# Patient Record
Sex: FEMALE | Race: WHITE | NOT HISPANIC OR LATINO
[De-identification: names, ages, dates, MRNs, and addresses within clinical notes are randomized per-mention and may not be internally consistent; named-entity substitution may affect disease eponyms.]

---

## 2017-01-10 ENCOUNTER — LABORATORY RESULT (OUTPATIENT)
Age: 82
End: 2017-01-10

## 2017-01-10 ENCOUNTER — APPOINTMENT (OUTPATIENT)
Dept: CARDIOLOGY | Facility: CLINIC | Age: 82
End: 2017-01-10

## 2017-01-10 ENCOUNTER — NON-APPOINTMENT (OUTPATIENT)
Age: 82
End: 2017-01-10

## 2017-01-10 VITALS
HEART RATE: 88 BPM | OXYGEN SATURATION: 97 % | SYSTOLIC BLOOD PRESSURE: 140 MMHG | BODY MASS INDEX: 29.64 KG/M2 | TEMPERATURE: 98 F | WEIGHT: 149 LBS | DIASTOLIC BLOOD PRESSURE: 90 MMHG | HEIGHT: 59.5 IN

## 2017-01-11 LAB
25(OH)D3 SERPL-MCNC: 31.3 NG/ML
ALBUMIN SERPL ELPH-MCNC: 4.4 G/DL
ALP BLD-CCNC: 74 U/L
ALT SERPL-CCNC: 7 U/L
ANION GAP SERPL CALC-SCNC: 15 MMOL/L
APPEARANCE: CLEAR
AST SERPL-CCNC: 16 U/L
BACTERIA: NEGATIVE
BASOPHILS # BLD AUTO: 0.01 K/UL
BASOPHILS NFR BLD AUTO: 0.1 %
BILIRUB SERPL-MCNC: 0.3 MG/DL
BILIRUBIN URINE: NEGATIVE
BLOOD URINE: NEGATIVE
BUN SERPL-MCNC: 12 MG/DL
CALCIUM SERPL-MCNC: 10.5 MG/DL
CHLORIDE SERPL-SCNC: 101 MMOL/L
CHOLEST SERPL-MCNC: 238 MG/DL
CHOLEST/HDLC SERPL: 4.5 RATIO
CO2 SERPL-SCNC: 25 MMOL/L
COLOR: ABNORMAL
CREAT SERPL-MCNC: 0.59 MG/DL
EOSINOPHIL # BLD AUTO: 0.09 K/UL
EOSINOPHIL NFR BLD AUTO: 1.3 %
FT4I SERPL CALC-MCNC: 8.9 INDEX
GLUCOSE QUALITATIVE U: NORMAL MG/DL
GLUCOSE SERPL-MCNC: 96 MG/DL
HBA1C MFR BLD HPLC: 5.8 %
HCT VFR BLD CALC: 42.8 %
HDLC SERPL-MCNC: 53 MG/DL
HGB BLD-MCNC: 13.9 G/DL
HYALINE CASTS: 1 /LPF
IMM GRANULOCYTES NFR BLD AUTO: 0.1 %
KETONES URINE: NEGATIVE
LDLC SERPL CALC-MCNC: 154 MG/DL
LEUKOCYTE ESTERASE URINE: NEGATIVE
LYMPHOCYTES # BLD AUTO: 2.68 K/UL
LYMPHOCYTES NFR BLD AUTO: 38.6 %
MAN DIFF?: NORMAL
MCHC RBC-ENTMCNC: 29.9 PG
MCHC RBC-ENTMCNC: 32.5 GM/DL
MCV RBC AUTO: 92 FL
MICROSCOPIC-UA: NORMAL
MONOCYTES # BLD AUTO: 0.43 K/UL
MONOCYTES NFR BLD AUTO: 6.2 %
NEUTROPHILS # BLD AUTO: 3.73 K/UL
NEUTROPHILS NFR BLD AUTO: 53.7 %
NITRITE URINE: NEGATIVE
PH URINE: 6
PLATELET # BLD AUTO: 401 K/UL
POTASSIUM SERPL-SCNC: 4.9 MMOL/L
PROT SERPL-MCNC: 7.7 G/DL
PROTEIN URINE: NEGATIVE MG/DL
RBC # BLD: 4.65 M/UL
RBC # FLD: 13.2 %
RED BLOOD CELLS URINE: 4 /HPF
SODIUM SERPL-SCNC: 141 MMOL/L
SPECIFIC GRAVITY URINE: 1.02
SQUAMOUS EPITHELIAL CELLS: 2 /HPF
T3 SERPL-MCNC: 122 NG/DL
T3RU NFR SERPL: 1.09 INDEX
T4 SERPL-MCNC: 9.7 UG/DL
TRIGL SERPL-MCNC: 155 MG/DL
TSH SERPL-ACNC: 0.47 UIU/ML
UROBILINOGEN URINE: NORMAL MG/DL
WBC # FLD AUTO: 6.95 K/UL
WHITE BLOOD CELLS URINE: 5 /HPF

## 2017-01-17 RX ORDER — ZOLPIDEM TARTRATE 6.25 MG/1
6.25 TABLET, EXTENDED RELEASE ORAL
Qty: 30 | Refills: 2 | Status: DISCONTINUED | COMMUNITY
Start: 2017-01-10 | End: 2017-01-17

## 2017-03-23 ENCOUNTER — MEDICATION RENEWAL (OUTPATIENT)
Age: 82
End: 2017-03-23

## 2017-05-16 ENCOUNTER — NON-APPOINTMENT (OUTPATIENT)
Age: 82
End: 2017-05-16

## 2017-05-16 ENCOUNTER — LABORATORY RESULT (OUTPATIENT)
Age: 82
End: 2017-05-16

## 2017-05-16 ENCOUNTER — APPOINTMENT (OUTPATIENT)
Dept: CARDIOLOGY | Facility: CLINIC | Age: 82
End: 2017-05-16

## 2017-05-16 VITALS
HEIGHT: 59.5 IN | WEIGHT: 149 LBS | BODY MASS INDEX: 29.64 KG/M2 | OXYGEN SATURATION: 99 % | HEART RATE: 76 BPM | SYSTOLIC BLOOD PRESSURE: 134 MMHG | DIASTOLIC BLOOD PRESSURE: 80 MMHG

## 2017-05-16 RX ORDER — ESZOPICLONE 2 MG/1
2 TABLET, FILM COATED ORAL
Qty: 30 | Refills: 5 | Status: DISCONTINUED | COMMUNITY
Start: 2017-01-17 | End: 2017-05-16

## 2017-05-16 RX ORDER — AZITHROMYCIN DIHYDRATE 250 MG/1
250 TABLET, FILM COATED ORAL
Qty: 1 | Refills: 0 | Status: DISCONTINUED | COMMUNITY
Start: 2017-02-15 | End: 2017-05-16

## 2017-05-17 LAB
25(OH)D3 SERPL-MCNC: 45.7 NG/ML
ALBUMIN SERPL ELPH-MCNC: 4.4 G/DL
ALP BLD-CCNC: 58 U/L
ALT SERPL-CCNC: 10 U/L
ANION GAP SERPL CALC-SCNC: 19 MMOL/L
APPEARANCE: CLEAR
AST SERPL-CCNC: 15 U/L
BACTERIA: NEGATIVE
BASOPHILS # BLD AUTO: 0.01 K/UL
BASOPHILS NFR BLD AUTO: 0.2 %
BILIRUB SERPL-MCNC: 0.2 MG/DL
BILIRUBIN URINE: NEGATIVE
BLOOD URINE: NEGATIVE
BUN SERPL-MCNC: 14 MG/DL
CALCIUM OXALATE CRYSTALS: ABNORMAL
CALCIUM SERPL-MCNC: 10.4 MG/DL
CHLORIDE SERPL-SCNC: 101 MMOL/L
CHOLEST SERPL-MCNC: 240 MG/DL
CHOLEST/HDLC SERPL: 3.9 RATIO
CO2 SERPL-SCNC: 23 MMOL/L
COLOR: YELLOW
CREAT SERPL-MCNC: 0.65 MG/DL
EOSINOPHIL # BLD AUTO: 0.12 K/UL
EOSINOPHIL NFR BLD AUTO: 1.9 %
FT4I SERPL CALC-MCNC: 8.8 INDEX
GLUCOSE QUALITATIVE U: NORMAL MG/DL
GLUCOSE SERPL-MCNC: 95 MG/DL
HBA1C MFR BLD HPLC: 5.7 %
HCT VFR BLD CALC: 41.5 %
HDLC SERPL-MCNC: 62 MG/DL
HGB BLD-MCNC: 13 G/DL
HYALINE CASTS: 2 /LPF
IMM GRANULOCYTES NFR BLD AUTO: 0.2 %
KETONES URINE: NEGATIVE
LDLC SERPL CALC-MCNC: 159 MG/DL
LEUKOCYTE ESTERASE URINE: NEGATIVE
LYMPHOCYTES # BLD AUTO: 2 K/UL
LYMPHOCYTES NFR BLD AUTO: 31.3 %
MAN DIFF?: NORMAL
MCHC RBC-ENTMCNC: 29.8 PG
MCHC RBC-ENTMCNC: 31.3 GM/DL
MCV RBC AUTO: 95.2 FL
MICROSCOPIC-UA: NORMAL
MONOCYTES # BLD AUTO: 0.49 K/UL
MONOCYTES NFR BLD AUTO: 7.7 %
NEUTROPHILS # BLD AUTO: 3.75 K/UL
NEUTROPHILS NFR BLD AUTO: 58.7 %
NITRITE URINE: NEGATIVE
PH URINE: 7
PLATELET # BLD AUTO: 337 K/UL
POTASSIUM SERPL-SCNC: 4.7 MMOL/L
PROT SERPL-MCNC: 7.1 G/DL
PROTEIN URINE: NEGATIVE MG/DL
RBC # BLD: 4.36 M/UL
RBC # FLD: 14 %
RED BLOOD CELLS URINE: 2 /HPF
SODIUM SERPL-SCNC: 143 MMOL/L
SPECIFIC GRAVITY URINE: 1.02
SQUAMOUS EPITHELIAL CELLS: 4 /HPF
T3 SERPL-MCNC: 115 NG/DL
T3RU NFR SERPL: 1.06 INDEX
T4 SERPL-MCNC: 9.3 UG/DL
TRIGL SERPL-MCNC: 96 MG/DL
TSH SERPL-ACNC: 0.84 UIU/ML
UROBILINOGEN URINE: NORMAL MG/DL
WBC # FLD AUTO: 6.38 K/UL
WHITE BLOOD CELLS URINE: 6 /HPF

## 2017-07-06 ENCOUNTER — RESULT REVIEW (OUTPATIENT)
Age: 82
End: 2017-07-06

## 2017-08-02 ENCOUNTER — MEDICATION RENEWAL (OUTPATIENT)
Age: 82
End: 2017-08-02

## 2017-08-03 ENCOUNTER — APPOINTMENT (OUTPATIENT)
Dept: ENDOCRINOLOGY | Facility: CLINIC | Age: 82
End: 2017-08-03
Payer: MEDICARE

## 2017-08-03 VITALS
HEART RATE: 94 BPM | SYSTOLIC BLOOD PRESSURE: 146 MMHG | BODY MASS INDEX: 29.43 KG/M2 | OXYGEN SATURATION: 97 % | HEIGHT: 59 IN | WEIGHT: 146 LBS | DIASTOLIC BLOOD PRESSURE: 94 MMHG

## 2017-08-03 PROCEDURE — 99214 OFFICE O/P EST MOD 30 MIN: CPT | Mod: 25

## 2017-08-03 PROCEDURE — 96372 THER/PROPH/DIAG INJ SC/IM: CPT

## 2017-08-03 RX ORDER — DENOSUMAB 60 MG/ML
60 INJECTION SUBCUTANEOUS
Qty: 1 | Refills: 0 | Status: COMPLETED | OUTPATIENT
Start: 2017-08-03

## 2017-08-03 RX ADMIN — DENOSUMAB 0 MG/ML: 60 INJECTION SUBCUTANEOUS at 00:00

## 2017-10-11 ENCOUNTER — MEDICATION RENEWAL (OUTPATIENT)
Age: 82
End: 2017-10-11

## 2017-11-18 ENCOUNTER — OUTPATIENT (OUTPATIENT)
Dept: OUTPATIENT SERVICES | Facility: HOSPITAL | Age: 82
LOS: 1 days | End: 2017-11-18
Payer: MEDICARE

## 2017-11-18 ENCOUNTER — APPOINTMENT (OUTPATIENT)
Dept: CT IMAGING | Facility: CLINIC | Age: 82
End: 2017-11-18
Payer: MEDICARE

## 2017-11-18 DIAGNOSIS — Z00.8 ENCOUNTER FOR OTHER GENERAL EXAMINATION: ICD-10-CM

## 2017-11-18 PROCEDURE — 74176 CT ABD & PELVIS W/O CONTRAST: CPT

## 2017-11-18 PROCEDURE — 74176 CT ABD & PELVIS W/O CONTRAST: CPT | Mod: 26

## 2017-12-11 ENCOUNTER — MEDICATION RENEWAL (OUTPATIENT)
Age: 82
End: 2017-12-11

## 2017-12-16 ENCOUNTER — APPOINTMENT (OUTPATIENT)
Dept: CT IMAGING | Facility: CLINIC | Age: 82
End: 2017-12-16
Payer: MEDICARE

## 2017-12-16 ENCOUNTER — OUTPATIENT (OUTPATIENT)
Dept: OUTPATIENT SERVICES | Facility: HOSPITAL | Age: 82
LOS: 1 days | End: 2017-12-16
Payer: MEDICARE

## 2017-12-16 DIAGNOSIS — R31.29 OTHER MICROSCOPIC HEMATURIA: ICD-10-CM

## 2017-12-16 PROCEDURE — 74178 CT ABD&PLV WO CNTR FLWD CNTR: CPT | Mod: 26

## 2017-12-16 PROCEDURE — 82565 ASSAY OF CREATININE: CPT

## 2017-12-16 PROCEDURE — 74178 CT ABD&PLV WO CNTR FLWD CNTR: CPT

## 2018-01-24 DIAGNOSIS — Z87.09 PERSONAL HISTORY OF OTHER DISEASES OF THE RESPIRATORY SYSTEM: ICD-10-CM

## 2018-01-30 ENCOUNTER — APPOINTMENT (OUTPATIENT)
Dept: CARDIOLOGY | Facility: CLINIC | Age: 83
End: 2018-01-30

## 2018-02-09 ENCOUNTER — APPOINTMENT (OUTPATIENT)
Dept: ENDOCRINOLOGY | Facility: CLINIC | Age: 83
End: 2018-02-09

## 2018-02-20 ENCOUNTER — APPOINTMENT (OUTPATIENT)
Dept: ENDOCRINOLOGY | Facility: CLINIC | Age: 83
End: 2018-02-20
Payer: MEDICARE

## 2018-02-20 VITALS
SYSTOLIC BLOOD PRESSURE: 122 MMHG | HEART RATE: 85 BPM | DIASTOLIC BLOOD PRESSURE: 62 MMHG | BODY MASS INDEX: 30.04 KG/M2 | WEIGHT: 149 LBS | OXYGEN SATURATION: 98 % | HEIGHT: 59 IN

## 2018-02-20 PROCEDURE — 99214 OFFICE O/P EST MOD 30 MIN: CPT | Mod: 25

## 2018-02-20 PROCEDURE — 96372 THER/PROPH/DIAG INJ SC/IM: CPT

## 2018-02-20 PROCEDURE — 77080 DXA BONE DENSITY AXIAL: CPT | Mod: GA

## 2018-02-20 RX ORDER — AZITHROMYCIN DIHYDRATE 250 MG/1
250 TABLET, FILM COATED ORAL
Qty: 1 | Refills: 0 | Status: DISCONTINUED | COMMUNITY
Start: 2018-01-24 | End: 2018-02-20

## 2018-02-20 RX ORDER — DENOSUMAB 60 MG/ML
60 INJECTION SUBCUTANEOUS
Qty: 0 | Refills: 0 | Status: COMPLETED | OUTPATIENT
Start: 2018-02-20

## 2018-02-20 RX ADMIN — DENOSUMAB 0 MG/ML: 60 INJECTION SUBCUTANEOUS at 00:00

## 2018-02-21 ENCOUNTER — MEDICATION RENEWAL (OUTPATIENT)
Age: 83
End: 2018-02-21

## 2018-02-26 ENCOUNTER — MEDICATION RENEWAL (OUTPATIENT)
Age: 83
End: 2018-02-26

## 2018-05-07 ENCOUNTER — LABORATORY RESULT (OUTPATIENT)
Age: 83
End: 2018-05-07

## 2018-05-07 ENCOUNTER — APPOINTMENT (OUTPATIENT)
Dept: CARDIOLOGY | Facility: CLINIC | Age: 83
End: 2018-05-07
Payer: MEDICARE

## 2018-05-07 ENCOUNTER — NON-APPOINTMENT (OUTPATIENT)
Age: 83
End: 2018-05-07

## 2018-05-07 VITALS
DIASTOLIC BLOOD PRESSURE: 84 MMHG | SYSTOLIC BLOOD PRESSURE: 140 MMHG | OXYGEN SATURATION: 99 % | HEART RATE: 81 BPM | BODY MASS INDEX: 29.64 KG/M2 | WEIGHT: 147 LBS | HEIGHT: 59 IN

## 2018-05-07 DIAGNOSIS — M62.838 OTHER MUSCLE SPASM: ICD-10-CM

## 2018-05-07 PROCEDURE — 36415 COLL VENOUS BLD VENIPUNCTURE: CPT

## 2018-05-07 PROCEDURE — 93000 ELECTROCARDIOGRAM COMPLETE: CPT

## 2018-05-07 PROCEDURE — 99214 OFFICE O/P EST MOD 30 MIN: CPT

## 2018-05-08 LAB
25(OH)D3 SERPL-MCNC: 31.6 NG/ML
ALBUMIN SERPL ELPH-MCNC: 4.8 G/DL
ALP BLD-CCNC: 57 U/L
ALT SERPL-CCNC: 14 U/L
ANION GAP SERPL CALC-SCNC: 13 MMOL/L
AST SERPL-CCNC: 44 U/L
BASOPHILS # BLD AUTO: 0.12 K/UL
BASOPHILS NFR BLD AUTO: 1.9 %
BILIRUB SERPL-MCNC: 0.2 MG/DL
BUN SERPL-MCNC: 13 MG/DL
CALCIUM SERPL-MCNC: 10.5 MG/DL
CHLORIDE SERPL-SCNC: 101 MMOL/L
CHOLEST SERPL-MCNC: 283 MG/DL
CHOLEST/HDLC SERPL: 4.7 RATIO
CO2 SERPL-SCNC: 27 MMOL/L
CREAT SERPL-MCNC: 0.78 MG/DL
EOSINOPHIL # BLD AUTO: 0.06 K/UL
EOSINOPHIL NFR BLD AUTO: 1 %
FT4I SERPL CALC-MCNC: 8.6 INDEX
GLUCOSE SERPL-MCNC: 89 MG/DL
HBA1C MFR BLD HPLC: 5.6 %
HCT VFR BLD CALC: 44.2 %
HDLC SERPL-MCNC: 60 MG/DL
HGB BLD-MCNC: 14.2 G/DL
IMM GRANULOCYTES NFR BLD AUTO: 0.2 %
LDLC SERPL CALC-MCNC: 181 MG/DL
LYMPHOCYTES # BLD AUTO: 2.64 K/UL
LYMPHOCYTES NFR BLD AUTO: 42.6 %
MAN DIFF?: NORMAL
MCHC RBC-ENTMCNC: 30.3 PG
MCHC RBC-ENTMCNC: 32.1 GM/DL
MCV RBC AUTO: 94.2 FL
MONOCYTES # BLD AUTO: 0.45 K/UL
MONOCYTES NFR BLD AUTO: 7.3 %
NEUTROPHILS # BLD AUTO: 2.91 K/UL
NEUTROPHILS NFR BLD AUTO: 47 %
PLATELET # BLD AUTO: 314 K/UL
POTASSIUM SERPL-SCNC: 6.2 MMOL/L
PROT SERPL-MCNC: 8.1 G/DL
RBC # BLD: 4.69 M/UL
RBC # FLD: 14 %
SODIUM SERPL-SCNC: 141 MMOL/L
T3 SERPL-MCNC: 154 NG/DL
T3RU NFR SERPL: 1.05 INDEX
T4 SERPL-MCNC: 9 UG/DL
TRIGL SERPL-MCNC: 209 MG/DL
TSH SERPL-ACNC: 0.36 UIU/ML
WBC # FLD AUTO: 6.19 K/UL

## 2018-05-14 ENCOUNTER — MEDICATION RENEWAL (OUTPATIENT)
Age: 83
End: 2018-05-14

## 2018-05-17 LAB
ANION GAP SERPL CALC-SCNC: 16 MMOL/L
BUN SERPL-MCNC: 16 MG/DL
CALCIUM SERPL-MCNC: 10.9 MG/DL
CALCIUM SERPL-MCNC: 11 MG/DL
CHLORIDE SERPL-SCNC: 100 MMOL/L
CO2 SERPL-SCNC: 27 MMOL/L
CREAT SERPL-MCNC: 0.73 MG/DL
GLUCOSE SERPL-MCNC: 93 MG/DL
PARATHYROID HORMONE INTACT: 35 PG/ML
POTASSIUM SERPL-SCNC: 4.4 MMOL/L
SODIUM SERPL-SCNC: 143 MMOL/L

## 2018-05-18 ENCOUNTER — MEDICATION RENEWAL (OUTPATIENT)
Age: 83
End: 2018-05-18

## 2018-07-16 ENCOUNTER — MEDICATION RENEWAL (OUTPATIENT)
Age: 83
End: 2018-07-16

## 2018-09-04 ENCOUNTER — APPOINTMENT (OUTPATIENT)
Dept: ENDOCRINOLOGY | Facility: CLINIC | Age: 83
End: 2018-09-04
Payer: MEDICARE

## 2018-09-04 VITALS
HEART RATE: 104 BPM | WEIGHT: 144 LBS | DIASTOLIC BLOOD PRESSURE: 70 MMHG | SYSTOLIC BLOOD PRESSURE: 120 MMHG | OXYGEN SATURATION: 98 % | HEIGHT: 59 IN | BODY MASS INDEX: 29.03 KG/M2

## 2018-09-04 PROCEDURE — 96372 THER/PROPH/DIAG INJ SC/IM: CPT

## 2018-09-04 PROCEDURE — 99214 OFFICE O/P EST MOD 30 MIN: CPT | Mod: 25

## 2018-09-04 RX ORDER — DENOSUMAB 60 MG/ML
60 INJECTION SUBCUTANEOUS
Qty: 0 | Refills: 0 | Status: COMPLETED | OUTPATIENT
Start: 2018-09-04

## 2018-09-04 RX ADMIN — DENOSUMAB 0 MG/ML: 60 INJECTION SUBCUTANEOUS at 00:00

## 2018-10-01 ENCOUNTER — MEDICATION RENEWAL (OUTPATIENT)
Age: 83
End: 2018-10-01

## 2018-11-27 ENCOUNTER — LABORATORY RESULT (OUTPATIENT)
Age: 83
End: 2018-11-27

## 2018-11-27 ENCOUNTER — NON-APPOINTMENT (OUTPATIENT)
Age: 83
End: 2018-11-27

## 2018-11-27 ENCOUNTER — APPOINTMENT (OUTPATIENT)
Dept: CARDIOLOGY | Facility: CLINIC | Age: 83
End: 2018-11-27
Payer: MEDICARE

## 2018-11-27 VITALS
BODY MASS INDEX: 29.43 KG/M2 | WEIGHT: 146 LBS | OXYGEN SATURATION: 97 % | HEART RATE: 69 BPM | DIASTOLIC BLOOD PRESSURE: 80 MMHG | SYSTOLIC BLOOD PRESSURE: 140 MMHG | HEIGHT: 59 IN

## 2018-11-27 PROCEDURE — 36415 COLL VENOUS BLD VENIPUNCTURE: CPT

## 2018-11-27 PROCEDURE — 93000 ELECTROCARDIOGRAM COMPLETE: CPT

## 2018-11-27 PROCEDURE — 99214 OFFICE O/P EST MOD 30 MIN: CPT

## 2018-11-28 ENCOUNTER — LABORATORY RESULT (OUTPATIENT)
Age: 83
End: 2018-11-28

## 2018-11-28 LAB
25(OH)D3 SERPL-MCNC: 71.7 NG/ML
ALBUMIN SERPL ELPH-MCNC: 4.5 G/DL
ALP BLD-CCNC: 50 U/L
ALT SERPL-CCNC: 12 U/L
ANION GAP SERPL CALC-SCNC: 10 MMOL/L
APPEARANCE: ABNORMAL
AST SERPL-CCNC: 16 U/L
BACTERIA: NEGATIVE
BASOPHILS # BLD AUTO: 0.01 K/UL
BASOPHILS NFR BLD AUTO: 0.2 %
BILIRUB SERPL-MCNC: 0.4 MG/DL
BILIRUBIN URINE: NEGATIVE
BLOOD URINE: NEGATIVE
BUN SERPL-MCNC: 10 MG/DL
CALCIUM SERPL-MCNC: 9.6 MG/DL
CHLORIDE SERPL-SCNC: 104 MMOL/L
CHOLEST SERPL-MCNC: 247 MG/DL
CHOLEST/HDLC SERPL: 4.3 RATIO
CO2 SERPL-SCNC: 28 MMOL/L
COLOR: YELLOW
CREAT SERPL-MCNC: 0.58 MG/DL
EOSINOPHIL # BLD AUTO: 0.08 K/UL
EOSINOPHIL NFR BLD AUTO: 1.6 %
FT4I SERPL CALC-MCNC: 6.5 INDEX
GLUCOSE QUALITATIVE U: NEGATIVE MG/DL
GLUCOSE SERPL-MCNC: 92 MG/DL
HBA1C MFR BLD HPLC: 5.5 %
HCT VFR BLD CALC: 43.1 %
HDLC SERPL-MCNC: 57 MG/DL
HGB BLD-MCNC: 13.6 G/DL
HYALINE CASTS: 2 /LPF
IMM GRANULOCYTES NFR BLD AUTO: 0 %
KETONES URINE: NEGATIVE
LDLC SERPL CALC-MCNC: 157 MG/DL
LEUKOCYTE ESTERASE URINE: NEGATIVE
LYMPHOCYTES # BLD AUTO: 2.14 K/UL
LYMPHOCYTES NFR BLD AUTO: 43.8 %
MAN DIFF?: NORMAL
MCHC RBC-ENTMCNC: 30.2 PG
MCHC RBC-ENTMCNC: 31.6 GM/DL
MCV RBC AUTO: 95.8 FL
MICROSCOPIC-UA: NORMAL
MONOCYTES # BLD AUTO: 0.46 K/UL
MONOCYTES NFR BLD AUTO: 9.4 %
NEUTROPHILS # BLD AUTO: 2.2 K/UL
NEUTROPHILS NFR BLD AUTO: 45 %
NITRITE URINE: NEGATIVE
PH URINE: 8.5
PLATELET # BLD AUTO: 305 K/UL
POTASSIUM SERPL-SCNC: 4.2 MMOL/L
PROT SERPL-MCNC: 7.1 G/DL
PROTEIN URINE: ABNORMAL MG/DL
RBC # BLD: 4.5 M/UL
RBC # FLD: 14.1 %
RED BLOOD CELLS URINE: 5 /HPF
SODIUM SERPL-SCNC: 143 MMOL/L
SPECIFIC GRAVITY URINE: 1.01
SQUAMOUS EPITHELIAL CELLS: 2 /HPF
T3 SERPL-MCNC: 109 NG/DL
T3RU NFR SERPL: 1.11 INDEX
T4 SERPL-MCNC: 7.2 UG/DL
TRIGL SERPL-MCNC: 164 MG/DL
TSH SERPL-ACNC: 0.65 UIU/ML
UROBILINOGEN URINE: NEGATIVE MG/DL
WBC # FLD AUTO: 4.89 K/UL
WHITE BLOOD CELLS URINE: 1 /HPF

## 2018-12-06 ENCOUNTER — RX RENEWAL (OUTPATIENT)
Age: 83
End: 2018-12-06

## 2018-12-10 ENCOUNTER — MEDICATION RENEWAL (OUTPATIENT)
Age: 83
End: 2018-12-10

## 2018-12-17 LAB — BACTERIA UR CULT: ABNORMAL

## 2018-12-31 ENCOUNTER — APPOINTMENT (OUTPATIENT)
Dept: OPHTHALMOLOGY | Facility: CLINIC | Age: 83
End: 2018-12-31
Payer: MEDICARE

## 2018-12-31 DIAGNOSIS — H26.9 UNSPECIFIED CATARACT: ICD-10-CM

## 2018-12-31 DIAGNOSIS — H04.123 DRY EYE SYNDROME OF BILATERAL LACRIMAL GLANDS: ICD-10-CM

## 2018-12-31 PROCEDURE — 92004 COMPRE OPH EXAM NEW PT 1/>: CPT

## 2019-02-14 PROBLEM — Z00.00 ENCOUNTER FOR PREVENTIVE HEALTH EXAMINATION: Noted: 2019-02-14

## 2019-02-19 ENCOUNTER — MEDICATION RENEWAL (OUTPATIENT)
Age: 84
End: 2019-02-19

## 2019-02-26 ENCOUNTER — APPOINTMENT (OUTPATIENT)
Dept: OPHTHALMOLOGY | Facility: CLINIC | Age: 84
End: 2019-02-26
Payer: MEDICARE

## 2019-02-26 ENCOUNTER — APPOINTMENT (OUTPATIENT)
Dept: OPHTHALMOLOGY | Facility: CLINIC | Age: 84
End: 2019-02-26

## 2019-02-26 DIAGNOSIS — H00.11 CHALAZION RIGHT UPPER EYELID: ICD-10-CM

## 2019-02-26 PROCEDURE — 92012 INTRM OPH EXAM EST PATIENT: CPT

## 2019-03-06 ENCOUNTER — APPOINTMENT (OUTPATIENT)
Dept: ENDOCRINOLOGY | Facility: CLINIC | Age: 84
End: 2019-03-06
Payer: MEDICARE

## 2019-03-06 ENCOUNTER — APPOINTMENT (OUTPATIENT)
Dept: UROLOGY | Facility: CLINIC | Age: 84
End: 2019-03-06

## 2019-03-06 VITALS
OXYGEN SATURATION: 98 % | BODY MASS INDEX: 29.84 KG/M2 | WEIGHT: 148 LBS | HEIGHT: 59 IN | HEART RATE: 83 BPM | DIASTOLIC BLOOD PRESSURE: 82 MMHG | SYSTOLIC BLOOD PRESSURE: 136 MMHG

## 2019-03-06 DIAGNOSIS — Z86.39 PERSONAL HISTORY OF OTHER ENDOCRINE, NUTRITIONAL AND METABOLIC DISEASE: ICD-10-CM

## 2019-03-06 PROCEDURE — 99214 OFFICE O/P EST MOD 30 MIN: CPT | Mod: 25

## 2019-03-06 PROCEDURE — 96372 THER/PROPH/DIAG INJ SC/IM: CPT

## 2019-03-06 RX ORDER — DENOSUMAB 60 MG/ML
60 INJECTION SUBCUTANEOUS
Qty: 1 | Refills: 0 | Status: COMPLETED | OUTPATIENT
Start: 2019-03-06

## 2019-03-06 RX ADMIN — DENOSUMAB 0 MG/ML: 60 INJECTION SUBCUTANEOUS at 00:00

## 2019-03-06 NOTE — REVIEW OF SYSTEMS
[Constipation] : constipation [As Noted in HPI] : as noted in HPI [Back Pain] : back pain [Negative] : Heme/Lymph

## 2019-03-07 NOTE — PROCEDURE
[FreeTextEntry1] : Bone mineral density 2/20/18\par indication: assess response to medication \par spine not performed due to DJD \par total hip -1.9 osteopenia, no significant change\par femoral neck -2.5 osteoporosis, no significant change \par proximal radius -1.7  osteopenia, no significant change \par \par Bone mineral density performed December 13, 2016\par Indication  measure response to medication\par Spine not performed\par Total hip -2.0, osteopenia, no significant change\par Femoral neck -2.7, osteoporosis, -4.2% which is not statistically significant\par Proximal radius -1.7, osteopenia, no significant change\par \par bone mineral density test performed May 5, 2015\par spine not analyzed due to scoliosis\par Total hip -2.1, osteopenia\par Femoral neck -2.5, osteoporosis\par Proximal radius -1.9, osteopenia

## 2019-03-07 NOTE — HISTORY OF PRESENT ILLNESS
[Alendronate (Fosomax)] : Alendronate [Ibandronate (Boniva)] : Ibandronate [Calcium (supplements)] : calcium from a dietary supplement [Patient taking Meds Correctly] : Patient is taking meds correctly [Prolia (Denosumab)] : Prolia (Denosumab) [Regular Dental Follow-Up] : regular dental follow-up [FreeTextEntry1] : 84 year-old female with osteoporosis. \par \par Told of low bone density for many years. She took Fosamax in the past and then took Boniva for some period of time although she does not remember how long she was on these medications. She believes she had been off all medications for more than 6 years. \par She suffered back pain in August of 2014 without obvious trauma.  X-rays reportedly revealed and L3 compression fx. A repeat MRI  in 2015 did not show any evidence of prior fracture. There is significant degenerative changes and disc disease in the back and a scoliosis which may have been misinterpreted as fracture. She does have back pain. saw pain management, had epidural .\par No history of other fractures.\par On Prolia since 5/2015, tolerating well. No interval fx. BMD 2018 stable. \par Last DDS last month.   [Disordered Eating] : no past or present history of disordered eating [Taking Steroids] : no past or present history of taking steroids [Kidney Stones] : no history of kidney stones [Family History of Osteoporosis] : no family history of osteoporosis [Family History of Breast Cancer] : no family history of breast cancer [Family History of Hip Fracture] : no family history of hip fracture [Hyperparathyroidism] : no hyperparathyroidism [History of Radiation Therapy] : no history of radiation therapy [History of Blood Clots] : no history of blood clots [Previous Fragility Fracture] : no previous fragility fracture

## 2019-03-07 NOTE — PHYSICAL EXAM
[Alert] : alert [No Acute Distress] : no acute distress [Well Nourished] : well nourished [Well Developed] : well developed [Normal Sclera/Conjunctiva] : normal sclera/conjunctiva [EOMI] : extra ocular movement intact [No Proptosis] : no proptosis [Normal Oropharynx] : the oropharynx was normal [Thyroid Not Enlarged] : the thyroid was not enlarged [No Thyroid Nodules] : there were no palpable thyroid nodules [No Respiratory Distress] : no respiratory distress [No Accessory Muscle Use] : no accessory muscle use [Clear to Auscultation] : lungs were clear to auscultation bilaterally [Normal Rate] : heart rate was normal  [Normal S1, S2] : normal S1 and S2 [Regular Rhythm] : with a regular rhythm [Normal Bowel Sounds] : normal bowel sounds [Not Tender] : non-tender [Soft] : abdomen soft [Not Distended] : not distended [Post Cervical Nodes] : posterior cervical nodes [Anterior Cervical Nodes] : anterior cervical nodes [Normal] : normal and non tender [Kyphosis] : kyphosis present [No Spinal Tenderness] : no spinal tenderness [Scoliosis] : scoliosis present [No Stigmata of Cushings Syndrome] : no stigmata of cushings syndrome [Normal Gait] : normal gait [Normal Strength/Tone] : muscle strength and tone were normal [No Rash] : no rash [Normal Reflexes] : deep tendon reflexes were 2+ and symmetric [No Tremors] : no tremors [Oriented x3] : oriented to person, place, and time [de-identified] : elderly appearing female

## 2019-03-07 NOTE — ASSESSMENT
[Denosumab Therapy] : Risks  and benefits of denosumab therapy were discussed with the patient including eczema, cellulitis, osteonecrosis of the jaw and atypical femur fractures [FreeTextEntry1] : 84 -year-old female with low bone density consistent with osteoporosis despite many years of Fosamax and Boniva. Possible spine compression fx in 2014. \par Began Prolia 2015. Tolerating well. No interval fx. \par BMD Fem neck sl decrease in 2016 reversed in 2018, stable in all sites. \par Ca: normal. Pt advised to reduce Ca; recommended calcium 500 mg per day, in addition to dietary intake.\par \par Continue Prolia buy and bill \par

## 2019-03-07 NOTE — PAST MEDICAL HISTORY
[Surgical Menopause] : The patient is in surgical menopause [Menopause Age____] : age at menopause was [unfilled] [History of Hormone Replacement Treatment] : has a history of hormone replacement treatment [de-identified] : took HR x 11 years

## 2019-03-12 ENCOUNTER — APPOINTMENT (OUTPATIENT)
Dept: OPHTHALMOLOGY | Facility: CLINIC | Age: 84
End: 2019-03-12

## 2019-04-26 ENCOUNTER — RX RENEWAL (OUTPATIENT)
Age: 84
End: 2019-04-26

## 2019-04-26 ENCOUNTER — MEDICATION RENEWAL (OUTPATIENT)
Age: 84
End: 2019-04-26

## 2019-06-07 ENCOUNTER — APPOINTMENT (OUTPATIENT)
Dept: CARDIOLOGY | Facility: CLINIC | Age: 84
End: 2019-06-07
Payer: MEDICARE

## 2019-06-07 ENCOUNTER — MEDICATION RENEWAL (OUTPATIENT)
Age: 84
End: 2019-06-07

## 2019-06-07 ENCOUNTER — LABORATORY RESULT (OUTPATIENT)
Age: 84
End: 2019-06-07

## 2019-06-07 ENCOUNTER — NON-APPOINTMENT (OUTPATIENT)
Age: 84
End: 2019-06-07

## 2019-06-07 VITALS
DIASTOLIC BLOOD PRESSURE: 80 MMHG | HEIGHT: 59 IN | HEART RATE: 75 BPM | BODY MASS INDEX: 28.63 KG/M2 | OXYGEN SATURATION: 97 % | SYSTOLIC BLOOD PRESSURE: 130 MMHG | WEIGHT: 142 LBS

## 2019-06-07 DIAGNOSIS — R20.2 ANESTHESIA OF SKIN: ICD-10-CM

## 2019-06-07 DIAGNOSIS — R20.0 ANESTHESIA OF SKIN: ICD-10-CM

## 2019-06-07 DIAGNOSIS — M79.604 PAIN IN RIGHT LEG: ICD-10-CM

## 2019-06-07 DIAGNOSIS — R00.2 PALPITATIONS: ICD-10-CM

## 2019-06-07 LAB
BASOPHILS # BLD AUTO: 0.02 K/UL
BASOPHILS NFR BLD AUTO: 0.3 %
EOSINOPHIL # BLD AUTO: 0.07 K/UL
EOSINOPHIL NFR BLD AUTO: 1.1 %
FT4I SERPL CALC-MCNC: 7.8 INDEX
HCT VFR BLD CALC: 40.1 %
HGB BLD-MCNC: 13.1 G/DL
IMM GRANULOCYTES NFR BLD AUTO: 0.2 %
LYMPHOCYTES # BLD AUTO: 2.54 K/UL
LYMPHOCYTES NFR BLD AUTO: 38.7 %
MAN DIFF?: NORMAL
MCHC RBC-ENTMCNC: 30 PG
MCHC RBC-ENTMCNC: 32.7 GM/DL
MCV RBC AUTO: 91.8 FL
MONOCYTES # BLD AUTO: 0.66 K/UL
MONOCYTES NFR BLD AUTO: 10 %
NEUTROPHILS # BLD AUTO: 3.27 K/UL
NEUTROPHILS NFR BLD AUTO: 49.7 %
PLATELET # BLD AUTO: 316 K/UL
RBC # BLD: 4.37 M/UL
RBC # FLD: 13.2 %
T3 SERPL-MCNC: 105 NG/DL
T3RU NFR SERPL: 1.1 TBI
T4 SERPL-MCNC: 8.2 UG/DL
TSH SERPL-ACNC: 0.69 UIU/ML
WBC # FLD AUTO: 6.57 K/UL

## 2019-06-07 PROCEDURE — 99215 OFFICE O/P EST HI 40 MIN: CPT

## 2019-06-07 PROCEDURE — 93000 ELECTROCARDIOGRAM COMPLETE: CPT

## 2019-06-07 PROCEDURE — 36415 COLL VENOUS BLD VENIPUNCTURE: CPT

## 2019-06-07 RX ORDER — AMLODIPINE BESYLATE 10 MG/1
10 TABLET ORAL DAILY
Qty: 90 | Refills: 3 | Status: DISCONTINUED | COMMUNITY
Start: 2018-07-23 | End: 2019-06-07

## 2019-06-10 LAB
25(OH)D3 SERPL-MCNC: 70.7 NG/ML
ALBUMIN SERPL ELPH-MCNC: 4.3 G/DL
ALP BLD-CCNC: 45 U/L
ALT SERPL-CCNC: 13 U/L
ANION GAP SERPL CALC-SCNC: 14 MMOL/L
AST SERPL-CCNC: 21 U/L
BILIRUB SERPL-MCNC: 0.3 MG/DL
BUN SERPL-MCNC: 17 MG/DL
CALCIUM SERPL-MCNC: 9.8 MG/DL
CHLORIDE SERPL-SCNC: 102 MMOL/L
CHOLEST SERPL-MCNC: 243 MG/DL
CHOLEST/HDLC SERPL: 4.5 RATIO
CO2 SERPL-SCNC: 23 MMOL/L
CREAT SERPL-MCNC: 0.68 MG/DL
ESTIMATED AVERAGE GLUCOSE: 111 MG/DL
GLUCOSE SERPL-MCNC: 95 MG/DL
HBA1C MFR BLD HPLC: 5.5 %
HDLC SERPL-MCNC: 54 MG/DL
LDLC SERPL CALC-MCNC: 163 MG/DL
POTASSIUM SERPL-SCNC: 4.8 MMOL/L
PROT SERPL-MCNC: 7.1 G/DL
SODIUM SERPL-SCNC: 139 MMOL/L
TRIGL SERPL-MCNC: 128 MG/DL

## 2019-07-22 ENCOUNTER — MEDICATION RENEWAL (OUTPATIENT)
Age: 84
End: 2019-07-22

## 2019-07-23 ENCOUNTER — MEDICATION RENEWAL (OUTPATIENT)
Age: 84
End: 2019-07-23

## 2019-09-05 ENCOUNTER — MEDICATION RENEWAL (OUTPATIENT)
Age: 84
End: 2019-09-05

## 2019-09-05 ENCOUNTER — RX RENEWAL (OUTPATIENT)
Age: 84
End: 2019-09-05

## 2019-10-07 ENCOUNTER — APPOINTMENT (OUTPATIENT)
Dept: ENDOCRINOLOGY | Facility: CLINIC | Age: 84
End: 2019-10-07
Payer: MEDICARE

## 2019-10-07 PROCEDURE — 99212 OFFICE O/P EST SF 10 MIN: CPT | Mod: 25

## 2019-10-07 PROCEDURE — 96372 THER/PROPH/DIAG INJ SC/IM: CPT

## 2019-10-07 RX ORDER — DENOSUMAB 60 MG/ML
60 INJECTION SUBCUTANEOUS
Qty: 1 | Refills: 0 | Status: COMPLETED | OUTPATIENT
Start: 2019-10-07

## 2019-10-07 RX ADMIN — DENOSUMAB 0 MG/ML: 60 INJECTION SUBCUTANEOUS at 00:00

## 2019-11-13 ENCOUNTER — MEDICATION RENEWAL (OUTPATIENT)
Age: 84
End: 2019-11-13

## 2019-11-26 ENCOUNTER — APPOINTMENT (OUTPATIENT)
Dept: OBGYN | Facility: CLINIC | Age: 84
End: 2019-11-26
Payer: MEDICARE

## 2019-11-26 PROCEDURE — 99203 OFFICE O/P NEW LOW 30 MIN: CPT

## 2019-12-10 ENCOUNTER — APPOINTMENT (OUTPATIENT)
Dept: OBGYN | Facility: CLINIC | Age: 84
End: 2019-12-10
Payer: MEDICARE

## 2019-12-10 PROCEDURE — 56405 I&D VULVA/PERINEAL ABSCESS: CPT

## 2019-12-20 ENCOUNTER — NON-APPOINTMENT (OUTPATIENT)
Age: 84
End: 2019-12-20

## 2019-12-20 ENCOUNTER — APPOINTMENT (OUTPATIENT)
Dept: CARDIOLOGY | Facility: CLINIC | Age: 84
End: 2019-12-20
Payer: MEDICARE

## 2019-12-20 ENCOUNTER — MEDICATION RENEWAL (OUTPATIENT)
Age: 84
End: 2019-12-20

## 2019-12-20 ENCOUNTER — LABORATORY RESULT (OUTPATIENT)
Age: 84
End: 2019-12-20

## 2019-12-20 VITALS
WEIGHT: 144 LBS | BODY MASS INDEX: 29.03 KG/M2 | HEIGHT: 59 IN | HEART RATE: 60 BPM | SYSTOLIC BLOOD PRESSURE: 122 MMHG | OXYGEN SATURATION: 97 % | DIASTOLIC BLOOD PRESSURE: 75 MMHG

## 2019-12-20 DIAGNOSIS — R11.10 VOMITING, UNSPECIFIED: ICD-10-CM

## 2019-12-20 DIAGNOSIS — K59.09 OTHER CONSTIPATION: ICD-10-CM

## 2019-12-20 PROCEDURE — 99214 OFFICE O/P EST MOD 30 MIN: CPT

## 2019-12-20 PROCEDURE — 36415 COLL VENOUS BLD VENIPUNCTURE: CPT

## 2019-12-20 PROCEDURE — 93000 ELECTROCARDIOGRAM COMPLETE: CPT

## 2019-12-22 LAB
25(OH)D3 SERPL-MCNC: 80.2 NG/ML
ALBUMIN SERPL ELPH-MCNC: 4.5 G/DL
ALP BLD-CCNC: 50 U/L
ALT SERPL-CCNC: 16 U/L
ANION GAP SERPL CALC-SCNC: 17 MMOL/L
APPEARANCE: CLEAR
AST SERPL-CCNC: 17 U/L
BACTERIA: NEGATIVE
BASOPHILS # BLD AUTO: 0.01 K/UL
BASOPHILS NFR BLD AUTO: 0.2 %
BILIRUB SERPL-MCNC: 0.2 MG/DL
BILIRUBIN URINE: NEGATIVE
BLOOD URINE: NEGATIVE
BUN SERPL-MCNC: 16 MG/DL
CALCIUM SERPL-MCNC: 10.2 MG/DL
CHLORIDE SERPL-SCNC: 104 MMOL/L
CHOLEST SERPL-MCNC: 246 MG/DL
CHOLEST/HDLC SERPL: 4.7 RATIO
CO2 SERPL-SCNC: 22 MMOL/L
COLOR: YELLOW
CREAT SERPL-MCNC: 0.72 MG/DL
EOSINOPHIL # BLD AUTO: 0.08 K/UL
EOSINOPHIL NFR BLD AUTO: 1.8 %
ESTIMATED AVERAGE GLUCOSE: 114 MG/DL
FT4I SERPL CALC-MCNC: 7.3 INDEX
GLUCOSE QUALITATIVE U: NEGATIVE
GLUCOSE SERPL-MCNC: 102 MG/DL
HBA1C MFR BLD HPLC: 5.6 %
HCT VFR BLD CALC: 38.6 %
HDLC SERPL-MCNC: 52 MG/DL
HGB BLD-MCNC: 12.6 G/DL
HYALINE CASTS: 6 /LPF
IMM GRANULOCYTES NFR BLD AUTO: 0.2 %
KETONES URINE: NEGATIVE
LDLC SERPL CALC-MCNC: 158 MG/DL
LEUKOCYTE ESTERASE URINE: NEGATIVE
LYMPHOCYTES # BLD AUTO: 1.49 K/UL
LYMPHOCYTES NFR BLD AUTO: 33.9 %
MAN DIFF?: NORMAL
MCHC RBC-ENTMCNC: 29.9 PG
MCHC RBC-ENTMCNC: 32.6 GM/DL
MCV RBC AUTO: 91.7 FL
MICROSCOPIC-UA: NORMAL
MONOCYTES # BLD AUTO: 0.45 K/UL
MONOCYTES NFR BLD AUTO: 10.3 %
NEUTROPHILS # BLD AUTO: 2.35 K/UL
NEUTROPHILS NFR BLD AUTO: 53.6 %
NITRITE URINE: NEGATIVE
PH URINE: 7
PLATELET # BLD AUTO: 282 K/UL
POTASSIUM SERPL-SCNC: 4.7 MMOL/L
PROT SERPL-MCNC: 7.1 G/DL
PROTEIN URINE: ABNORMAL
RBC # BLD: 4.21 M/UL
RBC # FLD: 13.2 %
RED BLOOD CELLS URINE: 5 /HPF
SODIUM SERPL-SCNC: 143 MMOL/L
SPECIFIC GRAVITY URINE: 1.03
SQUAMOUS EPITHELIAL CELLS: 4 /HPF
T3 SERPL-MCNC: 96 NG/DL
T3RU NFR SERPL: 1 TBI
T4 SERPL-MCNC: 7.3 UG/DL
TRIGL SERPL-MCNC: 180 MG/DL
TSH SERPL-ACNC: 0.58 UIU/ML
URINE COMMENTS: NORMAL
UROBILINOGEN URINE: NORMAL
WBC # FLD AUTO: 4.39 K/UL
WHITE BLOOD CELLS URINE: 7 /HPF

## 2019-12-30 RX ORDER — DULOXETINE HYDROCHLORIDE 60 MG/1
60 CAPSULE, DELAYED RELEASE PELLETS ORAL
Qty: 90 | Refills: 3 | Status: DISCONTINUED | COMMUNITY
Start: 2017-05-16 | End: 2019-12-30

## 2020-04-28 ENCOUNTER — APPOINTMENT (OUTPATIENT)
Dept: OBGYN | Facility: CLINIC | Age: 85
End: 2020-04-28

## 2020-05-12 ENCOUNTER — APPOINTMENT (OUTPATIENT)
Dept: ENDOCRINOLOGY | Facility: CLINIC | Age: 85
End: 2020-05-12

## 2020-06-14 ENCOUNTER — APPOINTMENT (OUTPATIENT)
Dept: DISASTER EMERGENCY | Facility: CLINIC | Age: 85
End: 2020-06-14

## 2020-06-14 DIAGNOSIS — Z01.818 ENCOUNTER FOR OTHER PREPROCEDURAL EXAMINATION: ICD-10-CM

## 2020-06-15 LAB — SARS-COV-2 N GENE NPH QL NAA+PROBE: NOT DETECTED

## 2020-07-02 ENCOUNTER — APPOINTMENT (OUTPATIENT)
Dept: ENDOCRINOLOGY | Facility: CLINIC | Age: 85
End: 2020-07-02
Payer: MEDICARE

## 2020-07-02 VITALS — BODY MASS INDEX: 31.49 KG/M2 | TEMPERATURE: 97.5 F | HEIGHT: 57.5 IN | WEIGHT: 148 LBS

## 2020-07-02 VITALS — DIASTOLIC BLOOD PRESSURE: 80 MMHG | OXYGEN SATURATION: 98 % | HEART RATE: 64 BPM | SYSTOLIC BLOOD PRESSURE: 120 MMHG

## 2020-07-02 PROCEDURE — 96372 THER/PROPH/DIAG INJ SC/IM: CPT

## 2020-07-02 PROCEDURE — 99214 OFFICE O/P EST MOD 30 MIN: CPT | Mod: 25

## 2020-07-02 PROCEDURE — ZZZZZ: CPT

## 2020-07-02 PROCEDURE — 77080 DXA BONE DENSITY AXIAL: CPT | Mod: GA

## 2020-07-02 RX ORDER — DENOSUMAB 60 MG/ML
60 INJECTION SUBCUTANEOUS
Qty: 1 | Refills: 0 | Status: COMPLETED | OUTPATIENT
Start: 2020-07-02

## 2020-07-02 RX ORDER — METOPROLOL SUCCINATE 50 MG/1
50 TABLET, EXTENDED RELEASE ORAL DAILY
Qty: 90 | Refills: 1 | Status: DISCONTINUED | COMMUNITY
Start: 2019-06-07 | End: 2020-07-02

## 2020-07-02 RX ORDER — METHOCARBAMOL 500 MG/1
500 TABLET, FILM COATED ORAL TWICE DAILY
Qty: 60 | Refills: 3 | Status: DISCONTINUED | COMMUNITY
Start: 2018-05-07 | End: 2020-07-02

## 2020-07-02 RX ORDER — ERYTHROMYCIN 5 MG/G
5 OINTMENT OPHTHALMIC
Qty: 1 | Refills: 1 | Status: DISCONTINUED | COMMUNITY
Start: 2019-02-26 | End: 2020-07-02

## 2020-07-02 RX ORDER — TEMAZEPAM 15 MG/1
15 CAPSULE ORAL
Qty: 30 | Refills: 0 | Status: DISCONTINUED | COMMUNITY
Start: 2017-05-16 | End: 2020-07-02

## 2020-07-02 RX ORDER — ROSUVASTATIN CALCIUM 40 MG/1
40 TABLET, FILM COATED ORAL
Qty: 90 | Refills: 0 | Status: DISCONTINUED | COMMUNITY
Start: 2018-05-18 | End: 2020-07-02

## 2020-07-02 RX ORDER — ESCITALOPRAM OXALATE 10 MG/1
10 TABLET ORAL DAILY
Qty: 2 | Refills: 3 | Status: DISCONTINUED | COMMUNITY
Start: 2019-12-30 | End: 2020-07-02

## 2020-07-02 RX ORDER — TRIAMTERENE AND HYDROCHLOROTHIAZIDE 25; 37.5 MG/1; MG/1
37.5-25 TABLET ORAL
Qty: 90 | Refills: 2 | Status: DISCONTINUED | COMMUNITY
Start: 2019-06-21 | End: 2020-07-02

## 2020-07-02 RX ADMIN — DENOSUMAB 60 MG/ML: 60 INJECTION SUBCUTANEOUS at 00:00

## 2020-07-02 NOTE — ASSESSMENT
[Denosumab Therapy] : Risks  and benefits of denosumab therapy were discussed with the patient including eczema, cellulitis, osteonecrosis of the jaw and atypical femur fractures [FreeTextEntry1] : 85 -year-old female with low bone density consistent with osteoporosis despite many years of Fosamax and Boniva. Possible spine compression fx in 2014. \par Began Prolia 2015. Tolerating well. No interval fx. \par BMD Fem neck sl decrease in 2016 reversed in 2018, stable in all sites. \par BMD 2020 stable. \par Exam: severe Kyphoscoliosis, stable on physical exam  Poor, unsteady gait\par Ca: normal. Pt advised to reduce Ca; recommended calcium 500 mg per day, in addition to dietary intake.\par Hypertension: Blood pressure well controlled on current medication. \par Continue Prolia buy and bill \par

## 2020-07-02 NOTE — PAST MEDICAL HISTORY
[Surgical Menopause] : The patient is in surgical menopause [Menopause Age____] : age at menopause was [unfilled] [History of Hormone Replacement Treatment] : has a history of hormone replacement treatment [de-identified] : took HR x 11 years

## 2020-07-02 NOTE — HISTORY OF PRESENT ILLNESS
[Alendronate (Fosomax)] : Alendronate [Patient taking Meds Correctly] : Patient is taking meds correctly [Calcium (supplements)] : calcium from a dietary supplement [Ibandronate (Boniva)] : Ibandronate [Prolia (Denosumab)] : Prolia (Denosumab) [Regular Dental Follow-Up] : regular dental follow-up [FreeTextEntry1] : Told of low bone density for many years. She took Fosamax in the past and then took Boniva for some period of time although she does not remember how long she was on these medications. She believes she had been off all medications for more than 6 years. \par She suffered back pain in August of 2014 without obvious trauma.  X-rays reportedly revealed and L3 compression fx. A repeat MRI  in 2015 did not show any evidence of prior fracture. There is significant degenerative changes and disc disease in the back and a scoliosis which may have been misinterpreted as fracture. She does have back pain. saw pain management, had epidural .\par No history of other fractures.\par On Prolia since 5/2015, tolerating well. No interval fx. BMD 2018 stable. Last dose Oct 2019 Delayed for Covid crisis. \par Last DDS > 6 mos ago. \par Has right sciatica type numbness [Disordered Eating] : no past or present history of disordered eating [Kidney Stones] : no history of kidney stones [Taking Steroids] : no past or present history of taking steroids [Family History of Osteoporosis] : no family history of osteoporosis [Family History of Breast Cancer] : no family history of breast cancer [Hyperparathyroidism] : no hyperparathyroidism [Family History of Hip Fracture] : no family history of hip fracture [History of Radiation Therapy] : no history of radiation therapy [History of Blood Clots] : no history of blood clots [Previous Fragility Fracture] : no previous fragility fracture

## 2020-07-02 NOTE — PHYSICAL EXAM
[Alert] : alert [Well Nourished] : well nourished [No Acute Distress] : no acute distress [Well Developed] : well developed [Normal Sclera/Conjunctiva] : normal sclera/conjunctiva [EOMI] : extra ocular movement intact [No Proptosis] : no proptosis [Normal Oropharynx] : the oropharynx was normal [Thyroid Not Enlarged] : the thyroid was not enlarged [No Thyroid Nodules] : no palpable thyroid nodules [No Respiratory Distress] : no respiratory distress [No Accessory Muscle Use] : no accessory muscle use [Clear to Auscultation] : lungs were clear to auscultation bilaterally [Normal Rate] : heart rate was normal [Normal S1, S2] : normal S1 and S2 [Regular Rhythm] : with a regular rhythm [No Edema] : no peripheral edema [Not Tender] : non-tender [Normal Bowel Sounds] : normal bowel sounds [Not Distended] : not distended [Soft] : abdomen soft [Normal Anterior Cervical Nodes] : no anterior cervical lymphadenopathy [Normal Posterior Cervical Nodes] : no posterior cervical lymphadenopathy [No Stigmata of Cushings Syndrome] : no stigmata of Cushings Syndrome [No Rash] : no rash [Normal Reflexes] : deep tendon reflexes were 2+ and symmetric [Acanthosis Nigricans] : no acanthosis nigricans [No Tremors] : no tremors [Oriented x3] : oriented to person, place, and time [de-identified] : Severe kyphoscoliosis [de-identified] : Early female [de-identified] : poor unsteady gait

## 2020-07-02 NOTE — PROCEDURE
[FreeTextEntry1] : Bone mineral density July 2, 2021\par Compared to 2018\par Spine not performed\par Total hip -2.0 osteopenia no significant change\par Femoral neck -2.3 osteopenia no significant change\par Proximal radius -1.9 osteopenia no significant change\par \par Bone mineral density 2/20/18\par indication: assess response to medication \par spine not performed due to DJD \par total hip -1.9 osteopenia, no significant change\par femoral neck -2.5 osteoporosis, no significant change \par proximal radius -1.7  osteopenia, no significant change \par \par Bone mineral density performed December 13, 2016\par Indication  measure response to medication\par Spine not performed\par Total hip -2.0, osteopenia, no significant change\par Femoral neck -2.7, osteoporosis, -4.2% which is not statistically significant\par Proximal radius -1.7, osteopenia, no significant change\par \par bone mineral density test performed May 5, 2015\par spine not analyzed due to scoliosis\par Total hip -2.1, osteopenia\par Femoral neck -2.5, osteoporosis\par Proximal radius -1.9, osteopenia

## 2020-07-06 LAB
25(OH)D3 SERPL-MCNC: 67.7 NG/ML
ALBUMIN SERPL ELPH-MCNC: 4.7 G/DL
ALP BLD-CCNC: 64 U/L
ALT SERPL-CCNC: 13 U/L
ANION GAP SERPL CALC-SCNC: 11 MMOL/L
AST SERPL-CCNC: 14 U/L
BASOPHILS # BLD AUTO: 0.02 K/UL
BASOPHILS NFR BLD AUTO: 0.4 %
BILIRUB SERPL-MCNC: 0.3 MG/DL
BUN SERPL-MCNC: 16 MG/DL
CALCIUM SERPL-MCNC: 10 MG/DL
CHLORIDE SERPL-SCNC: 101 MMOL/L
CHOLEST SERPL-MCNC: 271 MG/DL
CHOLEST/HDLC SERPL: 4.6 RATIO
CO2 SERPL-SCNC: 29 MMOL/L
CREAT SERPL-MCNC: 0.73 MG/DL
EOSINOPHIL # BLD AUTO: 0.06 K/UL
EOSINOPHIL NFR BLD AUTO: 1.3 %
GLUCOSE SERPL-MCNC: 91 MG/DL
HCT VFR BLD CALC: 39.4 %
HDLC SERPL-MCNC: 59 MG/DL
HGB BLD-MCNC: 12.3 G/DL
IMM GRANULOCYTES NFR BLD AUTO: 0.2 %
LDLC SERPL CALC-MCNC: 190 MG/DL
LYMPHOCYTES # BLD AUTO: 1.78 K/UL
LYMPHOCYTES NFR BLD AUTO: 38.5 %
MAN DIFF?: NORMAL
MCHC RBC-ENTMCNC: 29.5 PG
MCHC RBC-ENTMCNC: 31.2 GM/DL
MCV RBC AUTO: 94.5 FL
MONOCYTES # BLD AUTO: 0.51 K/UL
MONOCYTES NFR BLD AUTO: 11 %
NEUTROPHILS # BLD AUTO: 2.24 K/UL
NEUTROPHILS NFR BLD AUTO: 48.6 %
PLATELET # BLD AUTO: 291 K/UL
POTASSIUM SERPL-SCNC: 4.5 MMOL/L
PROT SERPL-MCNC: 7 G/DL
RBC # BLD: 4.17 M/UL
RBC # FLD: 13.2 %
SODIUM SERPL-SCNC: 141 MMOL/L
TRIGL SERPL-MCNC: 113 MG/DL
TSH SERPL-ACNC: 0.57 UIU/ML
WBC # FLD AUTO: 4.62 K/UL

## 2020-07-07 ENCOUNTER — RX RENEWAL (OUTPATIENT)
Age: 85
End: 2020-07-07

## 2020-08-11 ENCOUNTER — RX RENEWAL (OUTPATIENT)
Age: 85
End: 2020-08-11

## 2020-09-21 ENCOUNTER — LABORATORY RESULT (OUTPATIENT)
Age: 85
End: 2020-09-21

## 2020-09-21 ENCOUNTER — APPOINTMENT (OUTPATIENT)
Dept: CARDIOLOGY | Facility: CLINIC | Age: 85
End: 2020-09-21
Payer: MEDICARE

## 2020-09-21 ENCOUNTER — NON-APPOINTMENT (OUTPATIENT)
Age: 85
End: 2020-09-21

## 2020-09-21 VITALS
WEIGHT: 149 LBS | DIASTOLIC BLOOD PRESSURE: 80 MMHG | BODY MASS INDEX: 31.71 KG/M2 | SYSTOLIC BLOOD PRESSURE: 120 MMHG | OXYGEN SATURATION: 99 % | HEIGHT: 57.5 IN | HEART RATE: 67 BPM

## 2020-09-21 DIAGNOSIS — L03.114 CELLULITIS OF LEFT UPPER LIMB: ICD-10-CM

## 2020-09-21 PROCEDURE — 36415 COLL VENOUS BLD VENIPUNCTURE: CPT

## 2020-09-21 PROCEDURE — 93000 ELECTROCARDIOGRAM COMPLETE: CPT

## 2020-09-21 PROCEDURE — 99215 OFFICE O/P EST HI 40 MIN: CPT

## 2020-09-22 LAB
APPEARANCE: CLEAR
B BURGDOR IGG+IGM SER QL IB: NORMAL
BILIRUBIN URINE: NEGATIVE
BLOOD URINE: NEGATIVE
COLOR: YELLOW
GLUCOSE QUALITATIVE U: NEGATIVE
KETONES URINE: NEGATIVE
LEUKOCYTE ESTERASE URINE: NEGATIVE
NITRITE URINE: NEGATIVE
PH URINE: 7
PROTEIN URINE: NEGATIVE
SPECIFIC GRAVITY URINE: 1.02
UROBILINOGEN URINE: NORMAL

## 2020-12-16 PROBLEM — Z87.09 HISTORY OF UPPER RESPIRATORY INFECTION: Status: RESOLVED | Noted: 2018-01-24 | Resolved: 2020-12-16

## 2021-01-06 ENCOUNTER — APPOINTMENT (OUTPATIENT)
Dept: ENDOCRINOLOGY | Facility: CLINIC | Age: 86
End: 2021-01-06
Payer: MEDICARE

## 2021-01-06 VITALS
DIASTOLIC BLOOD PRESSURE: 86 MMHG | OXYGEN SATURATION: 99 % | HEIGHT: 57.5 IN | WEIGHT: 150 LBS | TEMPERATURE: 97 F | SYSTOLIC BLOOD PRESSURE: 170 MMHG | HEART RATE: 66 BPM | BODY MASS INDEX: 31.92 KG/M2

## 2021-01-06 PROCEDURE — 96372 THER/PROPH/DIAG INJ SC/IM: CPT

## 2021-01-06 PROCEDURE — 99214 OFFICE O/P EST MOD 30 MIN: CPT | Mod: 25

## 2021-01-06 RX ORDER — DENOSUMAB 60 MG/ML
60 INJECTION SUBCUTANEOUS
Qty: 1 | Refills: 0 | Status: COMPLETED | OUTPATIENT
Start: 2021-01-06

## 2021-01-06 RX ADMIN — DENOSUMAB 60 MG/ML: 60 INJECTION SUBCUTANEOUS at 00:00

## 2021-01-07 NOTE — HISTORY OF PRESENT ILLNESS
[Alendronate (Fosomax)] : Alendronate [Ibandronate (Boniva)] : Ibandronate [Calcium (supplements)] : calcium from a dietary supplement [Patient taking Meds Correctly] : Patient is taking meds correctly [Prolia (Denosumab)] : Prolia (Denosumab) [Regular Dental Follow-Up] : regular dental follow-up [FreeTextEntry1] : Told of low bone density for many years. She took Fosamax in the past and then took Boniva for some period of time although she does not remember how long she was on these medications. She believes she had been off all medications for more than 6 years. \par She suffered back pain in August of 2014 without obvious trauma.  X-rays reportedly revealed and L3 compression fx. A repeat MRI  in 2015 did not show any evidence of prior fracture. There is significant degenerative changes and disc disease in the back and a scoliosis which may have been misinterpreted as fracture. She does have back pain. saw pain management, had epidural .\par No history of other fractures.\par On Prolia since 5/2015, tolerating well. No interval fx. BMD 2018 stable.  Delayed for Covid crisis. \par Last DDS > 6 mos ago. \par Has right sciatica type numbness [Disordered Eating] : no past or present history of disordered eating [Taking Steroids] : no past or present history of taking steroids [Kidney Stones] : no history of kidney stones [Family History of Osteoporosis] : no family history of osteoporosis [Family History of Breast Cancer] : no family history of breast cancer [Family History of Hip Fracture] : no family history of hip fracture [Hyperparathyroidism] : no hyperparathyroidism [History of Radiation Therapy] : no history of radiation therapy [History of Blood Clots] : no history of blood clots [Previous Fragility Fracture] : no previous fragility fracture

## 2021-01-07 NOTE — PROCEDURE
[FreeTextEntry1] : Bone mineral density July 2, 2020\par Compared to 2018\par Spine not performed\par Total hip -2.0 osteopenia no significant change\par Femoral neck -2.3 osteopenia no significant change\par Proximal radius -1.9 osteopenia no significant change\par \par Bone mineral density 2/20/18\par indication: assess response to medication \par spine not performed due to DJD \par total hip -1.9 osteopenia, no significant change\par femoral neck -2.5 osteoporosis, no significant change \par proximal radius -1.7  osteopenia, no significant change \par \par Bone mineral density performed December 13, 2016\par Indication  measure response to medication\par Spine not performed\par Total hip -2.0, osteopenia, no significant change\par Femoral neck -2.7, osteoporosis, -4.2% which is not statistically significant\par Proximal radius -1.7, osteopenia, no significant change\par \par bone mineral density test performed May 5, 2015\par spine not analyzed due to scoliosis\par Total hip -2.1, osteopenia\par Femoral neck -2.5, osteoporosis\par Proximal radius -1.9, osteopenia

## 2021-01-07 NOTE — PHYSICAL EXAM
[Alert] : alert [Well Nourished] : well nourished [No Acute Distress] : no acute distress [Well Developed] : well developed [Normal Sclera/Conjunctiva] : normal sclera/conjunctiva [EOMI] : extra ocular movement intact [No Proptosis] : no proptosis [Normal Oropharynx] : the oropharynx was normal [Thyroid Not Enlarged] : the thyroid was not enlarged [No Thyroid Nodules] : no palpable thyroid nodules [Clear to Auscultation] : lungs were clear to auscultation bilaterally [Normal S1, S2] : normal S1 and S2 [Normal Rate] : heart rate was normal [Regular Rhythm] : with a regular rhythm [No Edema] : no peripheral edema [Normal Bowel Sounds] : normal bowel sounds [Not Tender] : non-tender [Not Distended] : not distended [Soft] : abdomen soft [Normal Anterior Cervical Nodes] : no anterior cervical lymphadenopathy [No Stigmata of Cushings Syndrome] : no stigmata of Cushings Syndrome [No Rash] : no rash [Normal Reflexes] : deep tendon reflexes were 2+ and symmetric [No Tremors] : no tremors [Oriented x3] : oriented to person, place, and time [Acanthosis Nigricans] : no acanthosis nigricans [de-identified] : Early female [de-identified] : Severe kyphoscoliosis [de-identified] : poor unsteady gait

## 2021-01-07 NOTE — PAST MEDICAL HISTORY
[Surgical Menopause] : The patient is in surgical menopause [Menopause Age____] : age at menopause was [unfilled] [History of Hormone Replacement Treatment] : has a history of hormone replacement treatment [de-identified] : took HR x 11 years

## 2021-01-07 NOTE — ASSESSMENT
[Denosumab Therapy] : Risks  and benefits of denosumab therapy were discussed with the patient including eczema, cellulitis, osteonecrosis of the jaw and atypical femur fractures [FreeTextEntry1] : 85 -year-old female with low bone density consistent with osteoporosis despite many years of Fosamax and Boniva. Possible spine compression fx in 2014. \par Began Prolia 2015. Tolerating well. No interval fx. \par BMD Fem neck sl decrease in 2016 reversed in 2018, stable in all sites. \par BMD 2020 stable. \par Exam: severe Kyphoscoliosis, stable on physical exam  Poor, unsteady gait\par Ca: normal. Pt advised to reduce Ca; recommended calcium 500 mg per day, in addition to dietary intake.\par Hypertension: Blood pressure high today, repeat   on current medication. \par c/o constipation, hard stools. can try mineral oil\par Continue Prolia buy and bill \par

## 2021-03-10 ENCOUNTER — APPOINTMENT (OUTPATIENT)
Dept: CARDIOLOGY | Facility: CLINIC | Age: 86
End: 2021-03-10
Payer: MEDICARE

## 2021-03-10 ENCOUNTER — NON-APPOINTMENT (OUTPATIENT)
Age: 86
End: 2021-03-10

## 2021-03-10 VITALS
HEART RATE: 68 BPM | DIASTOLIC BLOOD PRESSURE: 82 MMHG | OXYGEN SATURATION: 98 % | WEIGHT: 144 LBS | BODY MASS INDEX: 30.62 KG/M2 | SYSTOLIC BLOOD PRESSURE: 140 MMHG

## 2021-03-10 DIAGNOSIS — M79.10 MYALGIA, UNSPECIFIED SITE: ICD-10-CM

## 2021-03-10 PROCEDURE — 99214 OFFICE O/P EST MOD 30 MIN: CPT

## 2021-03-11 LAB
25(OH)D3 SERPL-MCNC: 90.1 NG/ML
ALBUMIN SERPL ELPH-MCNC: 4 G/DL
ALP BLD-CCNC: 53 U/L
ALT SERPL-CCNC: 9 U/L
ANION GAP SERPL CALC-SCNC: 11 MMOL/L
AST SERPL-CCNC: 16 U/L
BASOPHILS # BLD AUTO: 0.01 K/UL
BASOPHILS NFR BLD AUTO: 0.2 %
BILIRUB SERPL-MCNC: 0.2 MG/DL
BUN SERPL-MCNC: 16 MG/DL
CALCIUM SERPL-MCNC: 9.8 MG/DL
CHLORIDE SERPL-SCNC: 103 MMOL/L
CHOLEST SERPL-MCNC: 203 MG/DL
CK SERPL-CCNC: 35 U/L
CO2 SERPL-SCNC: 25 MMOL/L
CREAT SERPL-MCNC: 0.64 MG/DL
EOSINOPHIL # BLD AUTO: 0.03 K/UL
EOSINOPHIL NFR BLD AUTO: 0.6 %
ESTIMATED AVERAGE GLUCOSE: 120 MG/DL
GLUCOSE SERPL-MCNC: 105 MG/DL
HBA1C MFR BLD HPLC: 5.8 %
HCT VFR BLD CALC: 39.7 %
HDLC SERPL-MCNC: 38 MG/DL
HGB BLD-MCNC: 12.6 G/DL
IMM GRANULOCYTES NFR BLD AUTO: 0.4 %
LDLC SERPL CALC-MCNC: 120 MG/DL
LYMPHOCYTES # BLD AUTO: 1.34 K/UL
LYMPHOCYTES NFR BLD AUTO: 28.8 %
MAN DIFF?: NORMAL
MCHC RBC-ENTMCNC: 30.1 PG
MCHC RBC-ENTMCNC: 31.7 GM/DL
MCV RBC AUTO: 95 FL
MONOCYTES # BLD AUTO: 0.43 K/UL
MONOCYTES NFR BLD AUTO: 9.2 %
NEUTROPHILS # BLD AUTO: 2.83 K/UL
NEUTROPHILS NFR BLD AUTO: 60.8 %
NONHDLC SERPL-MCNC: 165 MG/DL
PLATELET # BLD AUTO: 272 K/UL
POTASSIUM SERPL-SCNC: 4.9 MMOL/L
PROT SERPL-MCNC: 6.7 G/DL
RBC # BLD: 4.18 M/UL
RBC # FLD: 13.7 %
SODIUM SERPL-SCNC: 139 MMOL/L
T4 SERPL-MCNC: 8.6 UG/DL
TRIGL SERPL-MCNC: 222 MG/DL
TSH SERPL-ACNC: 0.43 UIU/ML
WBC # FLD AUTO: 4.66 K/UL

## 2021-04-20 ENCOUNTER — NON-APPOINTMENT (OUTPATIENT)
Age: 86
End: 2021-04-20

## 2021-04-20 ENCOUNTER — APPOINTMENT (OUTPATIENT)
Dept: CARDIOLOGY | Facility: CLINIC | Age: 86
End: 2021-04-20
Payer: MEDICARE

## 2021-04-20 VITALS — SYSTOLIC BLOOD PRESSURE: 120 MMHG | HEART RATE: 67 BPM | DIASTOLIC BLOOD PRESSURE: 80 MMHG | OXYGEN SATURATION: 97 %

## 2021-04-20 PROCEDURE — 99215 OFFICE O/P EST HI 40 MIN: CPT

## 2021-04-26 ENCOUNTER — RX RENEWAL (OUTPATIENT)
Age: 86
End: 2021-04-26

## 2021-07-12 ENCOUNTER — APPOINTMENT (OUTPATIENT)
Dept: ENDOCRINOLOGY | Facility: CLINIC | Age: 86
End: 2021-07-12
Payer: MEDICARE

## 2021-07-12 VITALS
BODY MASS INDEX: 32.35 KG/M2 | TEMPERATURE: 97.7 F | DIASTOLIC BLOOD PRESSURE: 78 MMHG | WEIGHT: 152 LBS | HEIGHT: 57.5 IN | HEART RATE: 69 BPM | OXYGEN SATURATION: 96 % | SYSTOLIC BLOOD PRESSURE: 136 MMHG

## 2021-07-12 PROCEDURE — 99214 OFFICE O/P EST MOD 30 MIN: CPT | Mod: 25

## 2021-07-12 PROCEDURE — 96372 THER/PROPH/DIAG INJ SC/IM: CPT

## 2021-07-12 RX ORDER — DENOSUMAB 60 MG/ML
60 INJECTION SUBCUTANEOUS
Qty: 1 | Refills: 0 | Status: COMPLETED | OUTPATIENT
Start: 2021-07-12

## 2021-07-12 RX ADMIN — DENOSUMAB 60 MG/ML: 60 INJECTION SUBCUTANEOUS at 00:00

## 2021-07-13 NOTE — PHYSICAL EXAM
[Alert] : alert [Well Nourished] : well nourished [No Acute Distress] : no acute distress [Well Developed] : well developed [Normal Sclera/Conjunctiva] : normal sclera/conjunctiva [EOMI] : extra ocular movement intact [No Proptosis] : no proptosis [Thyroid Not Enlarged] : the thyroid was not enlarged [No Thyroid Nodules] : no palpable thyroid nodules [Clear to Auscultation] : lungs were clear to auscultation bilaterally [Normal S1, S2] : normal S1 and S2 [Normal Rate] : heart rate was normal [Regular Rhythm] : with a regular rhythm [No Edema] : no peripheral edema [Normal Bowel Sounds] : normal bowel sounds [Not Tender] : non-tender [Not Distended] : not distended [Soft] : abdomen soft [Normal Anterior Cervical Nodes] : no anterior cervical lymphadenopathy [Kyphosis] : kyphosis present [Scoliosis] : scoliosis present [No Stigmata of Cushings Syndrome] : no stigmata of Cushings Syndrome [Normal Reflexes] : deep tendon reflexes were 2+ and symmetric [No Tremors] : no tremors [Oriented x3] : oriented to person, place, and time [de-identified] : Elderly female [de-identified] : Severe kyphoscoliosis [de-identified] : poor unsteady gait

## 2021-07-13 NOTE — HISTORY OF PRESENT ILLNESS
[Alendronate (Fosomax)] : Alendronate [Ibandronate (Boniva)] : Ibandronate [Denosumab (Prolia)] : Denosumab [FreeTextEntry1] : No significant interval health changes. No interval surgery, hospitalizations, fractures, or change in medications.\par \par Told of low bone density for many years. She took Fosamax in the past and then took Boniva for some period of time although she does not remember how long she was on these medications. She believes she had been off all medications for more than 6 years. \par She suffered back pain in August of 2014 without obvious trauma. X-rays reportedly revealed and L3 compression fx. A repeat MRI  in 2015 did not show any evidence of prior fracture. There is significant degenerative changes and disc disease in the back and a scoliosis which may have been misinterpreted as fracture. She does have back pain. saw pain management, had epidural. No history of other fractures. Began Prolia 2015. Tolerating well. No thigh pain, no interval fx. No ONJ. BMD Fem neck sl decrease in 2016 reversed in 2018, stable in all sites. BMD 2020 stable. \par \par Pt daughter now requests more aggressive treatment based on outside imaging from ortho.  Patient was told that she has osteoporosis on bone density testing of the spine.  She has significant progressive curvature and was told by orthopedic surgery to use Forteo rather than continue Prolia.  I have personally reviewed outside bone density as Ethan.  Spine bone density was falsely elevated due to arthritis not at all osteoporotic.  Plain x-rays of the spine reported marked kyphoscoliosis with no vertebral compression fractures.\par \par Has right sciatica type numbness.

## 2021-07-13 NOTE — END OF VISIT
[FreeTextEntry3] : I, Florentin Mccray, authored this note working as a medical scribe for Dr. Piña.  07/12/2021.  3:30PM. This note was authored by the medical scribe for me. I have reviewed, edited, and revised the note as needed. I am in agreement with the exam findings, imaging findings, and treatment plan.  Luis Piña MD

## 2021-07-13 NOTE — ASSESSMENT
[Denosumab Therapy] : Risks  and benefits of denosumab therapy were discussed with the patient including eczema, cellulitis, osteonecrosis of the jaw and atypical femur fractures [FreeTextEntry1] : 86 year-old female with low bone density consistent with osteoporosis despite many years of Fosamax and Boniva. Possible spine compression fx in 2014. \par \par Began Prolia 2015. Tolerating well. No thigh pain, no interval fx. No ONJ. BMD Fem neck sl decrease in 2016 reversed in 2018, stable in all sites. BMD 2020 stable. Continue Prolia, buy and bill.\par \par Pt daughter now requests more aggressive treatment based on outside imaging from ortho.  Reviewed the actual results from radiology.  Spinal bone density was actually falsely elevated not low.  Plain x-ray of the spine does show marked kyphoscoliosis but no evidence of compression fractures as cause.\par Have discussed data that Forteo after Prolia is a poor choice leading to progressive bone loss (DATA trial, Dr Ricki Gonzalez, Lancet)\par \par Discussed monthly Evenity (romosozumab), an anti-sclersotin antibody given for 1 year. Pt would have to transition to another osteoporosis rx such has Prolia or bisphosphonate therapy after 1 year to avoid long term risks. Risks and benefits discussed including possible cardiovascular issues. Pt is not an ideal  candidate as she is  r failing standard care. All questions answered. I do not recommend more aggressive therapy at this time.\par \par Severe Kyphoscoliosis, stable on physical exam. Poor, unsteady gait.\par \par HTN well controlled on current medication.\par \par F/u in 6 months

## 2021-07-13 NOTE — PAST MEDICAL HISTORY
[Surgical Menopause] : The patient is in surgical menopause [Menopause Age____] : age at menopause was [unfilled] [History of Hormone Replacement Treatment] : has a history of hormone replacement treatment [de-identified] : took HR x 11 years

## 2021-08-01 ENCOUNTER — RX RENEWAL (OUTPATIENT)
Age: 86
End: 2021-08-01

## 2021-09-01 ENCOUNTER — APPOINTMENT (OUTPATIENT)
Dept: CARDIOLOGY | Facility: CLINIC | Age: 86
End: 2021-09-01

## 2021-09-14 ENCOUNTER — APPOINTMENT (OUTPATIENT)
Dept: CARDIOLOGY | Facility: CLINIC | Age: 86
End: 2021-09-14
Payer: MEDICARE

## 2021-09-14 ENCOUNTER — NON-APPOINTMENT (OUTPATIENT)
Age: 86
End: 2021-09-14

## 2021-09-14 VITALS — SYSTOLIC BLOOD PRESSURE: 106 MMHG | OXYGEN SATURATION: 97 % | HEART RATE: 68 BPM | DIASTOLIC BLOOD PRESSURE: 66 MMHG

## 2021-09-14 PROCEDURE — 99215 OFFICE O/P EST HI 40 MIN: CPT

## 2021-10-01 ENCOUNTER — APPOINTMENT (OUTPATIENT)
Age: 86
End: 2021-10-01
Payer: MEDICARE

## 2021-10-01 ENCOUNTER — OUTPATIENT (OUTPATIENT)
Dept: OUTPATIENT SERVICES | Facility: HOSPITAL | Age: 86
LOS: 1 days | End: 2021-10-01
Payer: MEDICARE

## 2021-10-01 DIAGNOSIS — Z00.00 ENCOUNTER FOR GENERAL ADULT MEDICAL EXAMINATION WITHOUT ABNORMAL FINDINGS: ICD-10-CM

## 2021-10-01 DIAGNOSIS — R13.10 DYSPHAGIA, UNSPECIFIED: ICD-10-CM

## 2021-10-01 PROCEDURE — 74221 X-RAY XM ESOPHAGUS 2CNTRST: CPT | Mod: 26

## 2021-10-01 PROCEDURE — 74221 X-RAY XM ESOPHAGUS 2CNTRST: CPT

## 2021-10-06 ENCOUNTER — APPOINTMENT (OUTPATIENT)
Dept: OPHTHALMOLOGY | Facility: CLINIC | Age: 86
End: 2021-10-06

## 2021-10-18 ENCOUNTER — APPOINTMENT (OUTPATIENT)
Dept: CARDIOLOGY | Facility: CLINIC | Age: 86
End: 2021-10-18
Payer: MEDICARE

## 2021-10-18 ENCOUNTER — NON-APPOINTMENT (OUTPATIENT)
Age: 86
End: 2021-10-18

## 2021-10-18 VITALS
WEIGHT: 144 LBS | OXYGEN SATURATION: 99 % | SYSTOLIC BLOOD PRESSURE: 110 MMHG | BODY MASS INDEX: 30.62 KG/M2 | HEART RATE: 69 BPM | DIASTOLIC BLOOD PRESSURE: 80 MMHG

## 2021-10-18 PROCEDURE — 93000 ELECTROCARDIOGRAM COMPLETE: CPT

## 2021-10-18 PROCEDURE — 93306 TTE W/DOPPLER COMPLETE: CPT

## 2021-10-18 PROCEDURE — 99214 OFFICE O/P EST MOD 30 MIN: CPT

## 2021-10-18 RX ORDER — TIZANIDINE 2 MG/1
2 TABLET ORAL
Qty: 60 | Refills: 1 | Status: DISCONTINUED | COMMUNITY
Start: 2021-03-10 | End: 2021-10-18

## 2021-10-18 RX ORDER — SPIRONOLACTONE 25 MG/1
25 TABLET ORAL
Qty: 30 | Refills: 3 | Status: DISCONTINUED | COMMUNITY
Start: 2021-09-02 | End: 2021-10-18

## 2021-10-18 RX ORDER — FAMOTIDINE 40 MG/1
40 TABLET, FILM COATED ORAL
Qty: 180 | Refills: 0 | Status: COMPLETED | COMMUNITY
Start: 2021-09-22

## 2021-10-18 RX ORDER — KETOCONAZOLE 20 MG/G
2 CREAM TOPICAL
Qty: 60 | Refills: 0 | Status: COMPLETED | COMMUNITY
Start: 2021-10-09

## 2021-10-18 RX ORDER — LACTULOSE 10 G/15ML
10 SOLUTION ORAL TWICE DAILY
Qty: 2 | Refills: 3 | Status: DISCONTINUED | COMMUNITY
Start: 2020-09-21 | End: 2021-10-18

## 2021-10-18 NOTE — HISTORY OF PRESENT ILLNESS
[Preoperative Visit] : for a medical evaluation prior to surgery [Scheduled Procedure ___] : a [unfilled] [Surgeon Name ___] : surgeon: [unfilled]

## 2021-11-24 ENCOUNTER — APPOINTMENT (OUTPATIENT)
Dept: CARDIOLOGY | Facility: CLINIC | Age: 86
End: 2021-11-24
Payer: MEDICARE

## 2021-11-24 VITALS
WEIGHT: 149 LBS | SYSTOLIC BLOOD PRESSURE: 173 MMHG | HEIGHT: 57.5 IN | BODY MASS INDEX: 31.71 KG/M2 | DIASTOLIC BLOOD PRESSURE: 93 MMHG

## 2021-11-24 PROCEDURE — 99214 OFFICE O/P EST MOD 30 MIN: CPT

## 2021-11-25 ENCOUNTER — NON-APPOINTMENT (OUTPATIENT)
Age: 86
End: 2021-11-25

## 2021-11-30 DIAGNOSIS — M25.562 PAIN IN RIGHT KNEE: ICD-10-CM

## 2021-11-30 DIAGNOSIS — G89.29 PAIN IN RIGHT KNEE: ICD-10-CM

## 2021-11-30 DIAGNOSIS — M25.561 PAIN IN RIGHT KNEE: ICD-10-CM

## 2022-01-18 ENCOUNTER — APPOINTMENT (OUTPATIENT)
Dept: ENDOCRINOLOGY | Facility: CLINIC | Age: 87
End: 2022-01-18
Payer: MEDICARE

## 2022-01-18 VITALS
SYSTOLIC BLOOD PRESSURE: 130 MMHG | HEART RATE: 78 BPM | TEMPERATURE: 97.8 F | WEIGHT: 142 LBS | DIASTOLIC BLOOD PRESSURE: 88 MMHG | OXYGEN SATURATION: 99 % | BODY MASS INDEX: 30.2 KG/M2

## 2022-01-18 PROCEDURE — 99214 OFFICE O/P EST MOD 30 MIN: CPT | Mod: 25

## 2022-01-18 PROCEDURE — 96372 THER/PROPH/DIAG INJ SC/IM: CPT

## 2022-01-18 RX ORDER — DENOSUMAB 60 MG/ML
60 INJECTION SUBCUTANEOUS
Qty: 1 | Refills: 0 | Status: COMPLETED | OUTPATIENT
Start: 2022-01-18

## 2022-01-18 RX ADMIN — DENOSUMAB 60 MG/ML: 60 INJECTION SUBCUTANEOUS at 00:00

## 2022-01-18 NOTE — END OF VISIT
[FreeTextEntry3] : I, Florentin Mccray, authored this note working as a medical scribe for Dr. Piña.  01/18/2022.  3:45PM. This note was authored by the medical scribe for me. I have reviewed, edited, and revised the note as needed. I am in agreement with the exam findings, imaging findings, and treatment plan.  Luis Piña MD

## 2022-01-18 NOTE — ASSESSMENT
[Denosumab Therapy] : Risks  and benefits of denosumab therapy were discussed with the patient including eczema, cellulitis, osteonecrosis of the jaw and atypical femur fractures [FreeTextEntry1] : 86 year-old female with osteoporosis \par \par Initially told of low bone density consistent with osteoporosis despite many years of Fosamax and Boniva. Possible spine compression fx in 2014. Began Prolia 2015. Tolerating well. No thigh pain, no interval fx. Normal Ca. No ONJ. BMD fem neck sl decrease in 12/2016 reversed in 2/2018, stable at all sites. BMD 7/2020 stable. Continue Prolia, buy and bill.\par \par Severe kyphosis, stable on physical examination. Poor, unsteady gait, using walker.\par \par Labs 9/2021 reviewed: Ca 10.0, normal. Creatinine 0.90, normal. \par \par F/u in 6 months w/ BMD

## 2022-01-18 NOTE — HISTORY OF PRESENT ILLNESS
[Alendronate (Fosomax)] : Alendronate [Ibandronate (Boniva)] : Ibandronate [Denosumab (Prolia)] : Denosumab [FreeTextEntry1] : \par \par Told of low bone density for many years. She took Fosamax in the past and then took Boniva for some period of time although she does not remember how long she was on these medications. She believes she had been off all medications for more than 6 years. \par She suffered back pain in August of 2014 without obvious trauma. X-rays reportedly revealed and L3 compression fx. A repeat MRI  in 2015 did not show any evidence of prior fracture. There is significant degenerative changes and disc disease in the back and a scoliosis which may have been misinterpreted as fracture. She does have back pain. saw pain management, had epidural. No history of other fractures. Began Prolia 2015. Tolerating well. No thigh pain, no interval fx. Normal Ca. Last DDS within past 6 months. No ONJ. Not planning major dental work. BMD fem neck sl decrease in 12/2016 reversed in 2/2018, stable at all sites. BMD 7/2020 stable.\par \par Pt daughter requested more aggressive treatment based on outside imaging from ortho. Patient was told that she has osteoporosis on bone density testing of the spine. She has significant progressive curvature and was told by orthopedic surgery to use Forteo rather than continue Prolia. I have personally reviewed outside bone density as Ethan. Spine bone density was falsely elevated due to arthritis not at all osteoporotic. Plain x-rays of the spine reported marked kyphoscoliosis with no vertebral compression fractures.\par \par Has right sciatica type numbness.\par \par Pt had laparoscopic appendectomy 9/2021 at [facility], initially had drain placed 7/2021. S/p she had some fluid in her lungs, resolved on Lasix.

## 2022-01-18 NOTE — PAST MEDICAL HISTORY
[Surgical Menopause] : The patient is in surgical menopause [Menopause Age____] : age at menopause was [unfilled] [History of Hormone Replacement Treatment] : has a history of hormone replacement treatment [de-identified] : took HR x 11 years

## 2022-01-18 NOTE — PHYSICAL EXAM
[Alert] : alert [Well Nourished] : well nourished [No Acute Distress] : no acute distress [Well Developed] : well developed [Normal Sclera/Conjunctiva] : normal sclera/conjunctiva [EOMI] : extra ocular movement intact [No Proptosis] : no proptosis [Thyroid Not Enlarged] : the thyroid was not enlarged [No Thyroid Nodules] : no palpable thyroid nodules [Clear to Auscultation] : lungs were clear to auscultation bilaterally [Normal S1, S2] : normal S1 and S2 [Normal Rate] : heart rate was normal [Regular Rhythm] : with a regular rhythm [No Edema] : no peripheral edema [Normal Bowel Sounds] : normal bowel sounds [Not Tender] : non-tender [Not Distended] : not distended [Soft] : abdomen soft [Normal Anterior Cervical Nodes] : no anterior cervical lymphadenopathy [Kyphosis] : kyphosis present [Scoliosis] : scoliosis present [No Stigmata of Cushings Syndrome] : no stigmata of Cushings Syndrome [Normal Reflexes] : deep tendon reflexes were 2+ and symmetric [No Tremors] : no tremors [Oriented x3] : oriented to person, place, and time [de-identified] : Elderly female [de-identified] : Severe kyphoscoliosis [de-identified] : poor unsteady gait, using walker

## 2022-02-15 ENCOUNTER — APPOINTMENT (OUTPATIENT)
Dept: CARDIOLOGY | Facility: CLINIC | Age: 87
End: 2022-02-15

## 2022-03-02 ENCOUNTER — RX RENEWAL (OUTPATIENT)
Age: 87
End: 2022-03-02

## 2022-03-04 ENCOUNTER — APPOINTMENT (OUTPATIENT)
Dept: CARDIOLOGY | Facility: CLINIC | Age: 87
End: 2022-03-04
Payer: MEDICARE

## 2022-03-04 ENCOUNTER — NON-APPOINTMENT (OUTPATIENT)
Age: 87
End: 2022-03-04

## 2022-03-04 VITALS — DIASTOLIC BLOOD PRESSURE: 80 MMHG | SYSTOLIC BLOOD PRESSURE: 110 MMHG

## 2022-03-04 VITALS — SYSTOLIC BLOOD PRESSURE: 88 MMHG | DIASTOLIC BLOOD PRESSURE: 68 MMHG

## 2022-03-04 VITALS — DIASTOLIC BLOOD PRESSURE: 78 MMHG | SYSTOLIC BLOOD PRESSURE: 143 MMHG

## 2022-03-04 VITALS
WEIGHT: 140 LBS | HEART RATE: 79 BPM | DIASTOLIC BLOOD PRESSURE: 85 MMHG | OXYGEN SATURATION: 97 % | SYSTOLIC BLOOD PRESSURE: 142 MMHG | BODY MASS INDEX: 29.77 KG/M2

## 2022-03-04 PROCEDURE — 99215 OFFICE O/P EST HI 40 MIN: CPT

## 2022-03-04 RX ORDER — DICLOFENAC SODIUM 50 MG/1
50 TABLET, DELAYED RELEASE ORAL TWICE DAILY
Qty: 180 | Refills: 3 | Status: DISCONTINUED | COMMUNITY
Start: 2020-09-21 | End: 2022-03-04

## 2022-03-04 RX ORDER — LABETALOL HYDROCHLORIDE 200 MG/1
200 TABLET, FILM COATED ORAL
Qty: 180 | Refills: 3 | Status: DISCONTINUED | COMMUNITY
Start: 2019-06-21 | End: 2022-03-04

## 2022-03-04 RX ORDER — CYCLOBENZAPRINE HYDROCHLORIDE 5 MG/1
5 TABLET, FILM COATED ORAL TWICE DAILY
Qty: 180 | Refills: 3 | Status: DISCONTINUED | COMMUNITY
Start: 2021-10-18 | End: 2022-03-04

## 2022-03-04 RX ORDER — FUROSEMIDE 40 MG/1
40 TABLET ORAL
Qty: 180 | Refills: 2 | Status: DISCONTINUED | COMMUNITY
Start: 2021-09-02 | End: 2022-03-04

## 2022-03-04 RX ORDER — MELOXICAM 7.5 MG/1
7.5 TABLET ORAL TWICE DAILY
Qty: 120 | Refills: 3 | Status: DISCONTINUED | COMMUNITY
Start: 2021-09-14 | End: 2022-03-04

## 2022-03-24 ENCOUNTER — APPOINTMENT (OUTPATIENT)
Dept: CARDIOLOGY | Facility: CLINIC | Age: 87
End: 2022-03-24
Payer: MEDICARE

## 2022-03-24 VITALS
WEIGHT: 136 LBS | BODY MASS INDEX: 28.92 KG/M2 | DIASTOLIC BLOOD PRESSURE: 80 MMHG | OXYGEN SATURATION: 96 % | HEART RATE: 93 BPM | SYSTOLIC BLOOD PRESSURE: 102 MMHG

## 2022-03-24 DIAGNOSIS — R30.0 DYSURIA: ICD-10-CM

## 2022-03-24 PROCEDURE — 99214 OFFICE O/P EST MOD 30 MIN: CPT

## 2022-03-24 PROCEDURE — 36415 COLL VENOUS BLD VENIPUNCTURE: CPT

## 2022-03-24 RX ORDER — SPIRONOLACTONE 25 MG/1
25 TABLET ORAL
Qty: 30 | Refills: 0 | Status: DISCONTINUED | COMMUNITY
Start: 2022-03-04 | End: 2022-03-24

## 2022-03-25 LAB
25(OH)D3 SERPL-MCNC: 95.6 NG/ML
ALBUMIN SERPL ELPH-MCNC: 4.6 G/DL
ALP BLD-CCNC: 65 U/L
ALT SERPL-CCNC: 12 U/L
ANION GAP SERPL CALC-SCNC: 12 MMOL/L
APPEARANCE: CLEAR
AST SERPL-CCNC: 15 U/L
BACTERIA: NEGATIVE
BASOPHILS # BLD AUTO: 0.01 K/UL
BASOPHILS NFR BLD AUTO: 0.3 %
BILIRUB SERPL-MCNC: 0.4 MG/DL
BILIRUBIN URINE: NEGATIVE
BLOOD URINE: NEGATIVE
BUN SERPL-MCNC: 21 MG/DL
CALCIUM SERPL-MCNC: 10.3 MG/DL
CHLORIDE SERPL-SCNC: 98 MMOL/L
CHOLEST SERPL-MCNC: 236 MG/DL
CO2 SERPL-SCNC: 28 MMOL/L
COLOR: NORMAL
CREAT SERPL-MCNC: 1.38 MG/DL
EGFR: 37 ML/MIN/1.73M2
EOSINOPHIL # BLD AUTO: 0.06 K/UL
EOSINOPHIL NFR BLD AUTO: 1.5 %
ESTIMATED AVERAGE GLUCOSE: 111 MG/DL
GLUCOSE QUALITATIVE U: NEGATIVE
GLUCOSE SERPL-MCNC: 111 MG/DL
HBA1C MFR BLD HPLC: 5.5 %
HCT VFR BLD CALC: 39.2 %
HDLC SERPL-MCNC: 57 MG/DL
HGB BLD-MCNC: 12.6 G/DL
HYALINE CASTS: 4 /LPF
IMM GRANULOCYTES NFR BLD AUTO: 0 %
KETONES URINE: NEGATIVE
LDLC SERPL CALC-MCNC: 155 MG/DL
LEUKOCYTE ESTERASE URINE: NEGATIVE
LYMPHOCYTES # BLD AUTO: 1.93 K/UL
LYMPHOCYTES NFR BLD AUTO: 49 %
MAN DIFF?: NORMAL
MCHC RBC-ENTMCNC: 29.8 PG
MCHC RBC-ENTMCNC: 32.1 GM/DL
MCV RBC AUTO: 92.7 FL
MICROSCOPIC-UA: NORMAL
MONOCYTES # BLD AUTO: 0.35 K/UL
MONOCYTES NFR BLD AUTO: 8.9 %
NEUTROPHILS # BLD AUTO: 1.59 K/UL
NEUTROPHILS NFR BLD AUTO: 40.3 %
NITRITE URINE: NEGATIVE
NONHDLC SERPL-MCNC: 179 MG/DL
PH URINE: 8
PLATELET # BLD AUTO: 332 K/UL
POTASSIUM SERPL-SCNC: 4.5 MMOL/L
PROT SERPL-MCNC: 7.3 G/DL
PROTEIN URINE: NORMAL
RBC # BLD: 4.23 M/UL
RBC # FLD: 13.4 %
RED BLOOD CELLS URINE: 1 /HPF
SODIUM SERPL-SCNC: 138 MMOL/L
SPECIFIC GRAVITY URINE: 1.01
SQUAMOUS EPITHELIAL CELLS: 1 /HPF
T4 SERPL-MCNC: 8.3 UG/DL
TRIGL SERPL-MCNC: 123 MG/DL
TSH SERPL-ACNC: 0.74 UIU/ML
UROBILINOGEN URINE: NORMAL
WBC # FLD AUTO: 3.94 K/UL
WHITE BLOOD CELLS URINE: 2 /HPF

## 2022-04-05 ENCOUNTER — NON-APPOINTMENT (OUTPATIENT)
Age: 87
End: 2022-04-05

## 2022-04-06 ENCOUNTER — RX RENEWAL (OUTPATIENT)
Age: 87
End: 2022-04-06

## 2022-04-20 RX ORDER — CLONAZEPAM 1 MG/1
1 TABLET ORAL
Qty: 60 | Refills: 0 | Status: DISCONTINUED | COMMUNITY
Start: 2021-04-20 | End: 2022-04-20

## 2022-05-02 ENCOUNTER — APPOINTMENT (OUTPATIENT)
Dept: PULMONOLOGY | Facility: CLINIC | Age: 87
End: 2022-05-02
Payer: MEDICARE

## 2022-05-02 VITALS
SYSTOLIC BLOOD PRESSURE: 138 MMHG | BODY MASS INDEX: 27.64 KG/M2 | TEMPERATURE: 97.9 F | OXYGEN SATURATION: 96 % | WEIGHT: 130 LBS | DIASTOLIC BLOOD PRESSURE: 80 MMHG | HEART RATE: 84 BPM

## 2022-05-02 DIAGNOSIS — Z87.891 PERSONAL HISTORY OF NICOTINE DEPENDENCE: ICD-10-CM

## 2022-05-02 PROCEDURE — 99204 OFFICE O/P NEW MOD 45 MIN: CPT

## 2022-05-02 RX ORDER — TRAMADOL HYDROCHLORIDE 50 MG/1
50 TABLET, COATED ORAL DAILY
Qty: 30 | Refills: 0 | Status: DISCONTINUED | COMMUNITY
Start: 2022-03-04 | End: 2022-05-02

## 2022-05-02 RX ORDER — TIZANIDINE 2 MG/1
2 TABLET ORAL
Qty: 90 | Refills: 0 | Status: DISCONTINUED | COMMUNITY
Start: 2022-03-04 | End: 2022-05-02

## 2022-05-02 RX ORDER — CYCLOBENZAPRINE HYDROCHLORIDE 10 MG/1
10 TABLET, FILM COATED ORAL
Qty: 30 | Refills: 0 | Status: DISCONTINUED | COMMUNITY
Start: 2022-04-20 | End: 2022-05-02

## 2022-05-02 NOTE — ASSESSMENT
[FreeTextEntry1] : spoke to pharmacy\par change in dose of the klonopin. daughter & i both agree that less dose is ideal given the patient age/lives aloen/ risk factors\par we will continue with klonopin 0.5mg PO QHS- 30min to 1 hour\par avoid any other pain medications/ muscle relaxants\par proceed with PSG\par \par avoid staying awake in bed\par avoid ruminations\par avoid wakeful activity in bed\par set bed time/wake up time\par short naps, no mor than 15 min\par no decaf tea/tea close to bed\par

## 2022-05-02 NOTE — HISTORY OF PRESENT ILLNESS
[Never] : never [TextBox_4] : USHA PAEZ is a 87 year old female who presents with family member for insomnia.\par \par She has been on klonopin for 1 year- the rx was 1mg PO BID but she was taking only 1 tablet before bed\par for the past month- harder to fall asleep. she denies any change in her environment, any new stressors\par \par family/daughter state patient is anxious. gets frustrated if she can't sleep in 10-15min\par ends up downstairs watching tv\par she used to read in bed also\par \par has been given 0.5mg now- last night it helped her sleep\par \par goes to bed around 930/10pm . despite klonopin can still take an hour to sleep.\par despite Klonopin- heladio wake up fo rnocturia\par starts her day around 9am.\par denies headaches, dizziness \par lives alone- unaware of apnea/s snoring or parasomnias\par \par wakes p for nocturia. variable waso \par \par no hst done\par \par

## 2022-05-02 NOTE — PHYSICAL EXAM
[No Acute Distress] : no acute distress [Well Nourished] : well nourished [Well Developed] : well developed [No Resp Distress] : no resp distress [No Acc Muscle Use] : no acc muscle use [TextBox_132] : needs assist device; aaox3

## 2022-05-26 ENCOUNTER — APPOINTMENT (OUTPATIENT)
Dept: OPHTHALMOLOGY | Facility: CLINIC | Age: 87
End: 2022-05-26
Payer: MEDICARE

## 2022-05-26 ENCOUNTER — NON-APPOINTMENT (OUTPATIENT)
Age: 87
End: 2022-05-26

## 2022-05-26 PROCEDURE — 92014 COMPRE OPH EXAM EST PT 1/>: CPT

## 2022-06-01 ENCOUNTER — NON-APPOINTMENT (OUTPATIENT)
Age: 87
End: 2022-06-01

## 2022-06-01 ENCOUNTER — APPOINTMENT (OUTPATIENT)
Dept: PULMONOLOGY | Facility: CLINIC | Age: 87
End: 2022-06-01
Payer: MEDICARE

## 2022-06-01 VITALS
HEART RATE: 84 BPM | SYSTOLIC BLOOD PRESSURE: 143 MMHG | TEMPERATURE: 97.2 F | DIASTOLIC BLOOD PRESSURE: 82 MMHG | OXYGEN SATURATION: 97 %

## 2022-06-01 DIAGNOSIS — Z72.820 SLEEP DEPRIVATION: ICD-10-CM

## 2022-06-01 DIAGNOSIS — G47.00 INSOMNIA, UNSPECIFIED: ICD-10-CM

## 2022-06-01 PROCEDURE — 99214 OFFICE O/P EST MOD 30 MIN: CPT | Mod: 25

## 2022-06-01 PROCEDURE — 36415 COLL VENOUS BLD VENIPUNCTURE: CPT

## 2022-06-02 PROBLEM — Z72.820 POOR SLEEP: Status: ACTIVE | Noted: 2022-05-02

## 2022-06-02 PROBLEM — G47.00 INSOMNIA: Status: ACTIVE | Noted: 2017-01-10

## 2022-06-02 LAB
FERRITIN SERPL-MCNC: 121 NG/ML
IRON SATN MFR SERPL: 31 %
IRON SERPL-MCNC: 74 UG/DL
TIBC SERPL-MCNC: 239 UG/DL
TRANSFERRIN SERPL-MCNC: 188 MG/DL
UIBC SERPL-MCNC: 165 UG/DL

## 2022-06-02 NOTE — HISTORY OF PRESENT ILLNESS
[TextBox_4] : USHA PAEZ is a 87 year old female who presents for f/u on PSG\par \par she is on klonopin 0.5mg\par \par PSG shows mild TORSTEN with mild desaturation and high PLMS, PACs on the EKG\par \par \par no other changes\par \par denies RLS symptoms\par \par \par \par \par \par [phone]\par Dr Ceci Rosales- cardio

## 2022-06-02 NOTE — PHYSICAL EXAM
[No Acute Distress] : no acute distress [Well Nourished] : well nourished [Well Developed] : well developed [No Resp Distress] : no resp distress [No Acc Muscle Use] : no acc muscle use [Clear to Auscultation Bilaterally] : clear to auscultation bilaterally

## 2022-06-13 ENCOUNTER — NON-APPOINTMENT (OUTPATIENT)
Age: 87
End: 2022-06-13

## 2022-06-15 ENCOUNTER — NON-APPOINTMENT (OUTPATIENT)
Age: 87
End: 2022-06-15

## 2022-07-27 ENCOUNTER — APPOINTMENT (OUTPATIENT)
Dept: ENDOCRINOLOGY | Facility: CLINIC | Age: 87
End: 2022-07-27

## 2022-07-27 VITALS — HEIGHT: 56.8 IN | BODY MASS INDEX: 29.53 KG/M2 | TEMPERATURE: 97.5 F | WEIGHT: 135 LBS

## 2022-07-27 VITALS — DIASTOLIC BLOOD PRESSURE: 80 MMHG | SYSTOLIC BLOOD PRESSURE: 130 MMHG | OXYGEN SATURATION: 99 % | HEART RATE: 82 BPM

## 2022-07-27 PROCEDURE — 77080 DXA BONE DENSITY AXIAL: CPT

## 2022-07-27 PROCEDURE — 96372 THER/PROPH/DIAG INJ SC/IM: CPT

## 2022-07-27 PROCEDURE — 99214 OFFICE O/P EST MOD 30 MIN: CPT | Mod: 25

## 2022-07-27 PROCEDURE — ZZZZZ: CPT

## 2022-07-27 RX ORDER — DENOSUMAB 60 MG/ML
60 INJECTION SUBCUTANEOUS
Qty: 1 | Refills: 0 | Status: COMPLETED | OUTPATIENT
Start: 2022-07-27

## 2022-07-27 RX ADMIN — DENOSUMAB 60 MG/ML: 60 INJECTION SUBCUTANEOUS at 00:00

## 2022-07-28 LAB
25(OH)D3 SERPL-MCNC: 96.6 NG/ML
ALBUMIN SERPL ELPH-MCNC: 4.3 G/DL
ALP BLD-CCNC: 66 U/L
ALT SERPL-CCNC: 10 U/L
ANION GAP SERPL CALC-SCNC: 13 MMOL/L
AST SERPL-CCNC: 16 U/L
BILIRUB SERPL-MCNC: 0.2 MG/DL
BUN SERPL-MCNC: 23 MG/DL
CALCIUM SERPL-MCNC: 10.2 MG/DL
CALCIUM SERPL-MCNC: 10.2 MG/DL
CHLORIDE SERPL-SCNC: 97 MMOL/L
CO2 SERPL-SCNC: 31 MMOL/L
CREAT SERPL-MCNC: 1.13 MG/DL
EGFR: 47 ML/MIN/1.73M2
GLUCOSE SERPL-MCNC: 88 MG/DL
PARATHYROID HORMONE INTACT: 43 PG/ML
PHOSPHATE SERPL-MCNC: 4 MG/DL
POTASSIUM SERPL-SCNC: 4.2 MMOL/L
PROT SERPL-MCNC: 7.2 G/DL
SODIUM SERPL-SCNC: 141 MMOL/L

## 2022-07-28 NOTE — END OF VISIT
[FreeTextEntry3] : This note was written by Luz Marina Wing on ( July 27, 2022) acting as a medical scribe for Dr. Piña.  This note was authored by the medical scribe for me. I have reviewed, edited, and revised the note as needed. I am in agreement with the exam findings, imaging findings, and treatment plan.  Luis Piña MD

## 2022-07-28 NOTE — PHYSICAL EXAM
[Alert] : alert [Well Nourished] : well nourished [No Acute Distress] : no acute distress [Well Developed] : well developed [Normal Sclera/Conjunctiva] : normal sclera/conjunctiva [EOMI] : extra ocular movement intact [No Proptosis] : no proptosis [Thyroid Not Enlarged] : the thyroid was not enlarged [No Thyroid Nodules] : no palpable thyroid nodules [Clear to Auscultation] : lungs were clear to auscultation bilaterally [Normal S1, S2] : normal S1 and S2 [Normal Rate] : heart rate was normal [Regular Rhythm] : with a regular rhythm [No Edema] : no peripheral edema [Normal Bowel Sounds] : normal bowel sounds [Not Tender] : non-tender [Not Distended] : not distended [Soft] : abdomen soft [Normal Anterior Cervical Nodes] : no anterior cervical lymphadenopathy [Kyphosis] : kyphosis present [Scoliosis] : scoliosis present [No Stigmata of Cushings Syndrome] : no stigmata of Cushings Syndrome [Normal Reflexes] : deep tendon reflexes were 2+ and symmetric [No Tremors] : no tremors [Oriented x3] : oriented to person, place, and time [de-identified] : Elderly female [de-identified] : Severe kyphoscoliosis [de-identified] : poor unsteady gait, using walker

## 2022-07-28 NOTE — PAST MEDICAL HISTORY
[Surgical Menopause] : The patient is in surgical menopause [Menopause Age____] : age at menopause was [unfilled] [History of Hormone Replacement Treatment] : has a history of hormone replacement treatment [de-identified] : took HR x 11 years

## 2022-07-28 NOTE — HISTORY OF PRESENT ILLNESS
[Alendronate (Fosomax)] : Alendronate [Ibandronate (Boniva)] : Ibandronate [Denosumab (Prolia)] : Denosumab [FreeTextEntry1] : Patient returns for a follow up visit for osteoporosis . Told of low bone density for many years. She took Fosamax in the past and then took Boniva for some period of time although she does not remember how long she was on these medications. She believes she had been off all medications for more than 6 years. \par She suffered back pain in August of 2014 without obvious trauma. X-rays reportedly revealed and L3 compression fx. A repeat MRI  in 2015 did not show any evidence of prior fracture. There is significant degenerative changes and disc disease in the back and a scoliosis which may have been misinterpreted as fracture. She does have back pain. saw pain management, had epidural. No history of other fractures. Began Prolia 2015. Tolerating well. No thigh pain, no interval fx. Normal Ca. Last DDS within past 6 months. No ONJ. Not planning major dental work. BMD fem neck sl decrease in 12/2016 reversed in 2/2018, stable at all sites. BMD 7/2020 stable.\par \par Pt daughter requested more aggressive treatment based on outside imaging from ortho. Patient was told that she has osteoporosis on bone density testing of the spine. She has significant progressive curvature and was told by orthopedic surgery to use Forteo rather than continue Prolia. I have personally reviewed outside bone density as Ethan. Spine bone density was falsely elevated due to arthritis not at all osteoporotic. Plain x-rays of the spine reported marked kyphoscoliosis with no vertebral compression fractures.\par \par Has right sciatica type numbness.\par \par Pt had laparoscopic appendectomy 9/2021 at [place], initially had drain placed 7/2021. S/p she had some fluid in her lungs, resolved on Lasix.

## 2022-07-28 NOTE — ASSESSMENT
[Denosumab Therapy] : Risks  and benefits of denosumab therapy were discussed with the patient including eczema, cellulitis, osteonecrosis of the jaw and atypical femur fractures [FreeTextEntry1] : 87 year-old female with osteoporosis \par \par Initially told of low bone density consistent with osteoporosis despite many years of Fosamax and Boniva. Possible spine compression fx in 2014. Began Prolia 2015. Tolerating well. No thigh pain, no interval fx. Normal Ca. No ONJ. BMD fem neck sl decrease in 12/2016 reversed in 2/2018, stable at all sites. BMD 7/2020 stable. BMD July 2022 shows that osteopenia in present in all sites. Overall improvement in bone density. Continue Prolia, buy and bill.\par \par I recommend taking 1000 IU of Vitamin D \par \par Severe kyphosis, stable on physical examination. Poor, unsteady gait, using walker.\par \par Labs \par Calcium 10.3\par Creatinine 1.38 high \par Vitamin D 95.6 high \par \par F/u in 6 months

## 2022-08-08 ENCOUNTER — APPOINTMENT (OUTPATIENT)
Dept: CARDIOLOGY | Facility: CLINIC | Age: 87
End: 2022-08-08

## 2022-11-08 ENCOUNTER — NON-APPOINTMENT (OUTPATIENT)
Age: 87
End: 2022-11-08

## 2022-11-08 ENCOUNTER — APPOINTMENT (OUTPATIENT)
Dept: CARDIOLOGY | Facility: CLINIC | Age: 87
End: 2022-11-08

## 2022-11-08 VITALS
HEIGHT: 56 IN | BODY MASS INDEX: 30.14 KG/M2 | HEART RATE: 65 BPM | RESPIRATION RATE: 17 BRPM | DIASTOLIC BLOOD PRESSURE: 84 MMHG | OXYGEN SATURATION: 98 % | SYSTOLIC BLOOD PRESSURE: 170 MMHG | WEIGHT: 134 LBS

## 2022-11-08 PROCEDURE — 99215 OFFICE O/P EST HI 40 MIN: CPT

## 2022-11-08 PROCEDURE — 36415 COLL VENOUS BLD VENIPUNCTURE: CPT

## 2022-11-08 PROCEDURE — 93000 ELECTROCARDIOGRAM COMPLETE: CPT

## 2022-11-08 RX ORDER — CLONAZEPAM 0.5 MG/1
0.5 TABLET ORAL
Qty: 5 | Refills: 0 | Status: DISCONTINUED | COMMUNITY
Start: 2018-11-27 | End: 2022-11-08

## 2022-11-08 RX ORDER — TAMSULOSIN HYDROCHLORIDE 0.4 MG/1
0.4 CAPSULE ORAL
Qty: 90 | Refills: 1 | Status: DISCONTINUED | COMMUNITY
Start: 2022-03-04 | End: 2022-11-08

## 2022-11-08 RX ORDER — CLONAZEPAM 0.5 MG/1
0.5 TABLET ORAL
Qty: 60 | Refills: 0 | Status: DISCONTINUED | COMMUNITY
Start: 2022-06-01 | End: 2022-11-08

## 2022-11-08 RX ORDER — SERTRALINE HYDROCHLORIDE 50 MG/1
50 TABLET, FILM COATED ORAL DAILY
Qty: 90 | Refills: 3 | Status: DISCONTINUED | COMMUNITY
Start: 2022-03-04 | End: 2022-11-08

## 2022-11-08 RX ORDER — METOPROLOL TARTRATE 25 MG/1
25 TABLET, FILM COATED ORAL DAILY
Refills: 0 | Status: ACTIVE | COMMUNITY
Start: 2022-08-05

## 2022-11-08 RX ORDER — CLONAZEPAM 0.5 MG/1
0.5 TABLET ORAL
Qty: 30 | Refills: 0 | Status: DISCONTINUED | COMMUNITY
Start: 2022-05-02 | End: 2022-11-08

## 2022-11-09 LAB
25(OH)D3 SERPL-MCNC: 64 NG/ML
ALBUMIN SERPL ELPH-MCNC: 4.4 G/DL
ALP BLD-CCNC: 47 U/L
ALT SERPL-CCNC: 6 U/L
ANION GAP SERPL CALC-SCNC: 10 MMOL/L
AST SERPL-CCNC: 13 U/L
BASOPHILS # BLD AUTO: 0.01 K/UL
BASOPHILS NFR BLD AUTO: 0.2 %
BILIRUB SERPL-MCNC: 0.3 MG/DL
BUN SERPL-MCNC: 16 MG/DL
CALCIUM SERPL-MCNC: 9.7 MG/DL
CALCIUM SERPL-MCNC: 9.7 MG/DL
CHLORIDE SERPL-SCNC: 101 MMOL/L
CHOLEST SERPL-MCNC: 225 MG/DL
CO2 SERPL-SCNC: 32 MMOL/L
CREAT SERPL-MCNC: 0.82 MG/DL
EGFR: 70 ML/MIN/1.73M2
EOSINOPHIL # BLD AUTO: 0.03 K/UL
EOSINOPHIL NFR BLD AUTO: 0.7 %
ESTIMATED AVERAGE GLUCOSE: 120 MG/DL
GLUCOSE SERPL-MCNC: 96 MG/DL
HBA1C MFR BLD HPLC: 5.8 %
HCT VFR BLD CALC: 38.1 %
HDLC SERPL-MCNC: 56 MG/DL
HGB BLD-MCNC: 12.1 G/DL
IMM GRANULOCYTES NFR BLD AUTO: 0.2 %
LDLC SERPL CALC-MCNC: 149 MG/DL
LYMPHOCYTES # BLD AUTO: 2.09 K/UL
LYMPHOCYTES NFR BLD AUTO: 45.5 %
MAN DIFF?: NORMAL
MCHC RBC-ENTMCNC: 30.6 PG
MCHC RBC-ENTMCNC: 31.8 GM/DL
MCV RBC AUTO: 96.2 FL
MONOCYTES # BLD AUTO: 0.47 K/UL
MONOCYTES NFR BLD AUTO: 10.2 %
NEUTROPHILS # BLD AUTO: 1.98 K/UL
NEUTROPHILS NFR BLD AUTO: 43.2 %
NONHDLC SERPL-MCNC: 169 MG/DL
NT-PROBNP SERPL-MCNC: 917 PG/ML
PARATHYROID HORMONE INTACT: 65 PG/ML
PLATELET # BLD AUTO: 274 K/UL
POTASSIUM SERPL-SCNC: 4.6 MMOL/L
PROT SERPL-MCNC: 6.9 G/DL
RBC # BLD: 3.96 M/UL
RBC # FLD: 13.5 %
SODIUM SERPL-SCNC: 143 MMOL/L
T4 SERPL-MCNC: 7.9 UG/DL
TRIGL SERPL-MCNC: 101 MG/DL
TSH SERPL-ACNC: 0.31 UIU/ML
WBC # FLD AUTO: 4.59 K/UL

## 2022-12-06 ENCOUNTER — APPOINTMENT (OUTPATIENT)
Dept: ORTHOPEDIC SURGERY | Facility: CLINIC | Age: 87
End: 2022-12-06

## 2022-12-06 VITALS
OXYGEN SATURATION: 98 % | HEIGHT: 64 IN | HEART RATE: 79 BPM | WEIGHT: 130 LBS | BODY MASS INDEX: 22.2 KG/M2 | DIASTOLIC BLOOD PRESSURE: 76 MMHG | SYSTOLIC BLOOD PRESSURE: 118 MMHG | TEMPERATURE: 97.8 F

## 2022-12-06 DIAGNOSIS — M17.11 UNILATERAL PRIMARY OSTEOARTHRITIS, RIGHT KNEE: ICD-10-CM

## 2022-12-06 PROCEDURE — 99203 OFFICE O/P NEW LOW 30 MIN: CPT | Mod: 25

## 2022-12-06 PROCEDURE — 20610 DRAIN/INJ JOINT/BURSA W/O US: CPT | Mod: RT

## 2022-12-06 PROCEDURE — 73564 X-RAY EXAM KNEE 4 OR MORE: CPT | Mod: RT

## 2022-12-06 NOTE — DISCUSSION/SUMMARY
[de-identified] : Pt is a pleasant 87 year old female with right knee pain secondary to severe osteoarthritis .  A lengthy discussion was held regarding the patients condition and treatment options including all risks, benefits, prognosis and outcomes of each were discussed in detail. The patient was advised on both cortisone and gel injections. Given her current clinical presentation, the patient was advised gel injections may not be as efficacious as cortisone injections and typically injections should be administered 3 months apart. However given her severe pain, the patient elected to have a right knee cortisone injection at today's office visit. The patient also inquired about having arthroscopic surgery for her knee and was advised this procedure is not indicated given her age and degree of arthritis. The patient would like to continue with conservative management at this time. Non-operative treatment was advised and a knee treatment handout was given and reviewed with the patient. The patient will contact me if there are any concerns. Follow up will be prn. The patient expressed understanding and all questions were answered.\par \par

## 2022-12-06 NOTE — CONSULT LETTER
[Dear  ___] : Dear  [unfilled], [FreeTextEntry1] : I had the pleasure of evaluating your patient in the office today for complaints of right knee pain secondary to severe osteoarthritis . I have enclosed a copy of today's office notes for your charts and for your review.\par \par Sincerely, \par \par Freddie Hernandez M.D.\par Professor and \par Department of Orthopedic Surgery\par [facility]\par

## 2022-12-06 NOTE — HISTORY OF PRESENT ILLNESS
[de-identified] : Patient is an 87 year old female who presents for initial evaluation of right knee pain. The patient is accompanied by her daughter. She complains of progressive right knee pain over the past several years which is limiting her ability to ambulate. She has previously received 2 series of HA injections, most recently approximately 1.5 months ago, which haven't helped significantly. She states the pain sometimes limits her ability to sleep fully throughout the night. She denies numbness or tingling.

## 2022-12-06 NOTE — PHYSICAL EXAM
[LE] : Sensory: Intact in bilateral lower extremities [de-identified] : Pt is a pleasant 87 year old female in NAD and AAOx3. 30.4 BMI. The pt has a moderate antalgic gait. Physical examination of her right knee reveals normal contours, skin intact with no signs of infection, no erythema, mild-moderate swelling/effusion, no distal lymphedema or phlebitis, no patholaxity. ROM of the right knee reveals -10°-110° Strength is 5/5 within this arc of motion. There is  pain on palpation to the medial/lateral joint line. Patient noted to have genu varum . There are no neurological deficits. 1+ PT, 2 + DP. \par  [de-identified] : X-rays were ordered, obtained, and interpreted by me today: 4 views of the right knee demonstrate severe osteoarthritis, most profound in the medial compartment with advanced medial compartment collapse and varus deformity , with no acute fracture or dislocation.\par

## 2022-12-06 NOTE — PROCEDURE
[de-identified] : After careful discussion with the patient regarding the risks versus benefits of a corticosteroid and local anesthetic injection, the patient has decided to proceed ahead with the treatment. After sterile preparation of the site, an injection has been given into the right knee using 8 cc of half percent Marcaine without epinephrine and 2 cc of betamethasone. The site was cleaned and a band-aid was applied. The patient tolerated the injection without complication\par \par Lot: [id number]\par Exp: 11/30/23.\par

## 2023-01-18 DIAGNOSIS — N32.81 OVERACTIVE BLADDER: ICD-10-CM

## 2023-01-30 ENCOUNTER — APPOINTMENT (OUTPATIENT)
Dept: CARDIOLOGY | Facility: CLINIC | Age: 88
End: 2023-01-30
Payer: MEDICARE

## 2023-01-30 ENCOUNTER — NON-APPOINTMENT (OUTPATIENT)
Age: 88
End: 2023-01-30

## 2023-01-30 VITALS
HEART RATE: 74 BPM | SYSTOLIC BLOOD PRESSURE: 145 MMHG | BODY MASS INDEX: 22.66 KG/M2 | OXYGEN SATURATION: 98 % | DIASTOLIC BLOOD PRESSURE: 84 MMHG | WEIGHT: 132 LBS

## 2023-01-30 DIAGNOSIS — G89.29 DORSALGIA, UNSPECIFIED: ICD-10-CM

## 2023-01-30 DIAGNOSIS — F41.9 ANXIETY DISORDER, UNSPECIFIED: ICD-10-CM

## 2023-01-30 DIAGNOSIS — M54.9 DORSALGIA, UNSPECIFIED: ICD-10-CM

## 2023-01-30 PROCEDURE — 93000 ELECTROCARDIOGRAM COMPLETE: CPT

## 2023-01-30 PROCEDURE — 99214 OFFICE O/P EST MOD 30 MIN: CPT

## 2023-01-30 RX ORDER — FAMOTIDINE 20 MG/1
20 TABLET, FILM COATED ORAL DAILY
Qty: 90 | Refills: 3 | Status: DISCONTINUED | COMMUNITY
Start: 2021-09-02 | End: 2023-01-30

## 2023-02-08 ENCOUNTER — APPOINTMENT (OUTPATIENT)
Dept: ENDOCRINOLOGY | Facility: CLINIC | Age: 88
End: 2023-02-08
Payer: MEDICARE

## 2023-02-08 VITALS
HEART RATE: 63 BPM | BODY MASS INDEX: 22.2 KG/M2 | RESPIRATION RATE: 14 BRPM | WEIGHT: 130 LBS | HEIGHT: 64 IN | OXYGEN SATURATION: 97 % | SYSTOLIC BLOOD PRESSURE: 144 MMHG | DIASTOLIC BLOOD PRESSURE: 88 MMHG

## 2023-02-08 DIAGNOSIS — M40.209 UNSPECIFIED KYPHOSIS, SITE UNSPECIFIED: ICD-10-CM

## 2023-02-08 PROCEDURE — 77080 DXA BONE DENSITY AXIAL: CPT | Mod: GA

## 2023-02-08 PROCEDURE — 99214 OFFICE O/P EST MOD 30 MIN: CPT | Mod: 25

## 2023-02-08 PROCEDURE — 96372 THER/PROPH/DIAG INJ SC/IM: CPT

## 2023-02-08 RX ORDER — DENOSUMAB 60 MG/ML
60 INJECTION SUBCUTANEOUS
Qty: 1 | Refills: 0 | Status: COMPLETED | OUTPATIENT
Start: 2023-02-08

## 2023-02-08 RX ADMIN — DENOSUMAB 60 MG/ML: 60 INJECTION SUBCUTANEOUS at 00:00

## 2023-02-09 PROBLEM — M40.209 KYPHOSIS: Status: ACTIVE | Noted: 2020-07-02

## 2023-02-09 NOTE — PAST MEDICAL HISTORY
[Surgical Menopause] : The patient is in surgical menopause [Menopause Age____] : age at menopause was [unfilled] [History of Hormone Replacement Treatment] : has a history of hormone replacement treatment [de-identified] : took HR x 11 years

## 2023-02-09 NOTE — PROCEDURE
[FreeTextEntry1] : Bone Density July 27, 2022\par Indication vs 2020 Asses response to medication \par Spine: not performed\par Total Hip-1.9 osteopenia , no significant change \par Femoral Neck -2.2 osteopenia , no significant change \par Proximal Radius -1.7 osteopenia no significant change \par \par Bone mineral density July 2, 2020\par Compared to 2018\par Spine not performed\par Total hip -2.0 osteopenia no significant change\par Femoral neck -2.3 osteopenia no significant change\par Proximal radius -1.9 osteopenia no significant change\par \par Bone mineral density 2/20/18\par indication: assess response to medication \par spine not performed due to DJD \par total hip -1.9 osteopenia, no significant change\par femoral neck -2.5 osteoporosis, no significant change \par proximal radius -1.7  osteopenia, no significant change \par \par Bone mineral density performed December 13, 2016\par Indication  measure response to medication\par Spine not performed\par Total hip -2.0, osteopenia, no significant change\par Femoral neck -2.7, osteoporosis, -4.2% which is not statistically significant\par Proximal radius -1.7, osteopenia, no significant change\par \par bone mineral density test performed May 5, 2015\par spine not analyzed due to scoliosis\par Total hip -2.1, osteopenia\par Femoral neck -2.5, osteoporosis\par Proximal radius -1.9, osteopenia

## 2023-02-09 NOTE — PHYSICAL EXAM
[Alert] : alert [Well Nourished] : well nourished [No Acute Distress] : no acute distress [Well Developed] : well developed [Normal Sclera/Conjunctiva] : normal sclera/conjunctiva [EOMI] : extra ocular movement intact [No Proptosis] : no proptosis [Thyroid Not Enlarged] : the thyroid was not enlarged [No Thyroid Nodules] : no palpable thyroid nodules [Clear to Auscultation] : lungs were clear to auscultation bilaterally [Normal S1, S2] : normal S1 and S2 [Normal Rate] : heart rate was normal [Regular Rhythm] : with a regular rhythm [No Edema] : no peripheral edema [Normal Bowel Sounds] : normal bowel sounds [Not Tender] : non-tender [Not Distended] : not distended [Soft] : abdomen soft [Normal Anterior Cervical Nodes] : no anterior cervical lymphadenopathy [Kyphosis] : kyphosis present [Scoliosis] : scoliosis present [No Stigmata of Cushings Syndrome] : no stigmata of Cushings Syndrome [Normal Reflexes] : deep tendon reflexes were 2+ and symmetric [No Tremors] : no tremors [Oriented x3] : oriented to person, place, and time [de-identified] : Elderly female [de-identified] : Severe kyphoscoliosis [de-identified] : poor unsteady gait, using walker

## 2023-02-09 NOTE — END OF VISIT
[FreeTextEntry3] : This note was written by Luz Marina Wing on ( February 8, 2023) acting as a medical scribe for Dr. Piña.  This note was authored by the medical scribe for me. I have reviewed, edited, and revised the note as needed. I am in agreement with the exam findings, imaging findings, and treatment plan.  Luis Piña MD

## 2023-02-09 NOTE — HISTORY OF PRESENT ILLNESS
[Alendronate (Fosomax)] : Alendronate [Ibandronate (Boniva)] : Ibandronate [Denosumab (Prolia)] : Denosumab [FreeTextEntry1] : Patient returns for a follow up visit for osteoporosis . Pt reports some shoulder pian. \par \par  Told of low bone density for many years. She took Fosamax in the past and then took Boniva for some period of time although she does not remember how long she was on these medications. She believes she had been off all medications for more than 6 years. \par She suffered back pain in August of 2014 without obvious trauma. X-rays reportedly revealed and L3 compression fx. A repeat MRI  in 2015 did not show any evidence of prior fracture. There is significant degenerative changes and disc disease in the back and a scoliosis which may have been misinterpreted as fracture. She does have back pain. saw pain management, had epidural. No history of other fractures. Began Prolia 2015. Tolerating well. No thigh pain, no interval fx. Normal Ca. Last DDS within past 6 months. No ONJ. Not planning major dental work. BMD fem neck sl decrease in 12/2016 reversed in 2/2018, stable at all sites. BMD 7/2020 stable.\par \par Pt daughter requested more aggressive treatment based on outside imaging from ortho. Patient was told that she has osteoporosis on bone density testing of the spine. She has significant progressive curvature and was told by orthopedic surgery to use Forteo rather than continue Prolia. I have personally reviewed outside bone density as Ethan. Spine bone density was falsely elevated due to arthritis not at all osteoporotic. Plain x-rays of the spine reported marked kyphoscoliosis with no vertebral compression fractures.\par \par Has right sciatica type numbness.\par \par Pt had laparoscopic appendectomy 9/2021 at [place], initially had drain placed 7/2021. S/p she had some fluid in her lungs, resolved on Lasix.

## 2023-02-09 NOTE — ASSESSMENT
[Denosumab Therapy] : Risks  and benefits of denosumab therapy were discussed with the patient including eczema, cellulitis, osteonecrosis of the jaw and atypical femur fractures [FreeTextEntry1] : 87 year-old female with osteoporosis \par \par Initially told of low bone density consistent with osteoporosis despite many years of Fosamax and Boniva. Possible spine compression fx in 2014. Began Prolia 2015. Tolerating well. No thigh pain, no interval fx. Normal Ca. No ONJ. BMD fem neck sl decrease in 12/2016 reversed in 2/2018, stable at all sites. BMD 7/2020 stable. BMD July 2022 shows that osteopenia in present in all sites. Overall improvement in bone density. Continue Prolia, buy and bill.\par \par I recommend taking 1000 IU of Vitamin D \par \par Severe kyphosis, stable on physical examination. \par \par I discussed that pt can follow up with an orthopedist for shoulder pain. \par \par F/u in 6 months repeat BMD in 1 year

## 2023-05-22 ENCOUNTER — APPOINTMENT (OUTPATIENT)
Dept: CARDIOLOGY | Facility: CLINIC | Age: 88
End: 2023-05-22
Payer: MEDICARE

## 2023-05-22 ENCOUNTER — NON-APPOINTMENT (OUTPATIENT)
Age: 88
End: 2023-05-22

## 2023-05-22 VITALS
SYSTOLIC BLOOD PRESSURE: 108 MMHG | RESPIRATION RATE: 17 BRPM | HEIGHT: 64 IN | OXYGEN SATURATION: 98 % | BODY MASS INDEX: 22.88 KG/M2 | HEART RATE: 64 BPM | DIASTOLIC BLOOD PRESSURE: 70 MMHG | WEIGHT: 134 LBS

## 2023-05-22 DIAGNOSIS — I50.9 HEART FAILURE, UNSPECIFIED: ICD-10-CM

## 2023-05-22 DIAGNOSIS — R10.11 RIGHT UPPER QUADRANT PAIN: ICD-10-CM

## 2023-05-22 PROCEDURE — 99214 OFFICE O/P EST MOD 30 MIN: CPT

## 2023-05-22 PROCEDURE — 36415 COLL VENOUS BLD VENIPUNCTURE: CPT

## 2023-05-22 PROCEDURE — 93000 ELECTROCARDIOGRAM COMPLETE: CPT

## 2023-05-22 RX ORDER — FESOTERODINE FUMARATE 4 MG/1
4 TABLET, FILM COATED, EXTENDED RELEASE ORAL DAILY
Qty: 90 | Refills: 1 | Status: ACTIVE | COMMUNITY
Start: 2023-05-22 | End: 1900-01-01

## 2023-05-22 RX ORDER — MIRABEGRON 25 MG/1
25 TABLET, FILM COATED, EXTENDED RELEASE ORAL
Qty: 90 | Refills: 0 | Status: DISCONTINUED | COMMUNITY
Start: 2023-01-18 | End: 2023-05-22

## 2023-05-23 LAB
25(OH)D3 SERPL-MCNC: 78.1 NG/ML
ALBUMIN SERPL ELPH-MCNC: 4.5 G/DL
ALP BLD-CCNC: 47 U/L
ALT SERPL-CCNC: 7 U/L
ANION GAP SERPL CALC-SCNC: 12 MMOL/L
AST SERPL-CCNC: 15 U/L
BILIRUB SERPL-MCNC: 0.3 MG/DL
BUN SERPL-MCNC: 19 MG/DL
CALCIUM SERPL-MCNC: 9.8 MG/DL
CHLORIDE SERPL-SCNC: 100 MMOL/L
CHOLEST SERPL-MCNC: 238 MG/DL
CO2 SERPL-SCNC: 30 MMOL/L
CREAT SERPL-MCNC: 1.05 MG/DL
EGFR: 51 ML/MIN/1.73M2
ESTIMATED AVERAGE GLUCOSE: 117 MG/DL
GLUCOSE SERPL-MCNC: 100 MG/DL
HBA1C MFR BLD HPLC: 5.7 %
HDLC SERPL-MCNC: 72 MG/DL
LDLC SERPL CALC-MCNC: 153 MG/DL
NONHDLC SERPL-MCNC: 166 MG/DL
POTASSIUM SERPL-SCNC: 4.3 MMOL/L
PROT SERPL-MCNC: 7 G/DL
SODIUM SERPL-SCNC: 143 MMOL/L
T4 SERPL-MCNC: 9.2 UG/DL
TRIGL SERPL-MCNC: 69 MG/DL
TSH SERPL-ACNC: 0.38 UIU/ML

## 2023-07-14 ENCOUNTER — APPOINTMENT (OUTPATIENT)
Dept: ULTRASOUND IMAGING | Facility: CLINIC | Age: 88
End: 2023-07-14

## 2023-07-14 ENCOUNTER — APPOINTMENT (OUTPATIENT)
Dept: MAMMOGRAPHY | Facility: CLINIC | Age: 88
End: 2023-07-14
Payer: MEDICARE

## 2023-07-14 ENCOUNTER — RESULT REVIEW (OUTPATIENT)
Age: 88
End: 2023-07-14

## 2023-07-14 PROCEDURE — 77067 SCR MAMMO BI INCL CAD: CPT

## 2023-07-14 PROCEDURE — 77063 BREAST TOMOSYNTHESIS BI: CPT

## 2023-08-09 ENCOUNTER — APPOINTMENT (OUTPATIENT)
Dept: ENDOCRINOLOGY | Facility: CLINIC | Age: 88
End: 2023-08-09
Payer: MEDICARE

## 2023-08-09 PROCEDURE — 96372 THER/PROPH/DIAG INJ SC/IM: CPT

## 2023-08-09 RX ORDER — DENOSUMAB 60 MG/ML
60 INJECTION SUBCUTANEOUS
Qty: 1 | Refills: 0 | Status: COMPLETED | OUTPATIENT
Start: 2023-08-08

## 2023-09-19 ENCOUNTER — APPOINTMENT (OUTPATIENT)
Dept: CARDIOLOGY | Facility: CLINIC | Age: 88
End: 2023-09-19

## 2023-10-16 ENCOUNTER — APPOINTMENT (OUTPATIENT)
Dept: ULTRASOUND IMAGING | Facility: CLINIC | Age: 88
End: 2023-10-16
Payer: MEDICARE

## 2023-10-16 ENCOUNTER — OUTPATIENT (OUTPATIENT)
Dept: OUTPATIENT SERVICES | Facility: HOSPITAL | Age: 88
LOS: 1 days | End: 2023-10-16
Payer: MEDICARE

## 2023-10-16 DIAGNOSIS — R10.11 RIGHT UPPER QUADRANT PAIN: ICD-10-CM

## 2023-10-16 PROCEDURE — 76700 US EXAM ABDOM COMPLETE: CPT | Mod: 26

## 2023-10-16 PROCEDURE — 76700 US EXAM ABDOM COMPLETE: CPT

## 2023-11-07 ENCOUNTER — APPOINTMENT (OUTPATIENT)
Dept: CT IMAGING | Facility: CLINIC | Age: 88
End: 2023-11-07

## 2024-02-01 NOTE — PROCEDURE
Internal Medicine Progress Note            Subjective:      ALLERGIES:  Lactose intolerance   (food or med), Dust, Dust mite extract, and Lac bovis (food or med)     Hospital Meds  Current Facility-Administered Medications   Medication Dose Route Frequency Provider Last Rate Last Admin    [START ON 2/2/2024] Vanc random 2/2@0600   Does not apply See Admin Instructions Milad Marlow DO        sodium chloride 0.9 % injection 10 mL  10 mL Intracatheter PRN Tom Cole MD        sodium chloride (NORMAL SALINE) 0.9 % bolus 100-200 mL  100-200 mL Intravenous PRN Tom Cole MD        meropenem (MERREM) 1 g in sodium chloride 0.9 % 100 mL IVPB  1 g Intravenous Daily Damian Perdomo  mL/hr at 01/31/24 1720 1 g at 01/31/24 1720    [Held by provider] midodrine (PROAMATINE) tablet 5 mg  5 mg Oral TID AC Beau Carlton DO        hydrALAZINE (APRESOLINE) injection 10 mg  10 mg Intravenous Q6H PRN Beau Carlton DO        HYDROmorphone (DILAUDID) injection 0.2 mg  0.2 mg Intravenous Q4H PRN Pedrito, Concepción A, DO        atorvastatin (LIPITOR) tablet 40 mg  40 mg Oral Nightly Pedrito, Concepción A, DO   40 mg at 01/31/24 2108    aspirin chewable 81 mg  81 mg Oral Daily Pedrito, Concepción A, DO   81 mg at 02/01/24 0926    docusate sodium-sennosides (SENOKOT S) 50-8.6 MG 2 tablet  2 tablet Oral QHS Pedrito, Concepción A, DO   2 tablet at 01/30/24 2127    pantoprazole (PROTONIX) EC tablet 40 mg  40 mg Oral QAM AC Pedrito, Concepción A, DO   40 mg at 02/01/24 0533    sertraline (ZOLOFT) tablet 75 mg  75 mg Oral Daily Pedrito, Concepción A, DO   75 mg at 02/01/24 0925    VANCOMYCIN - PHARMACIST MONITORED Misc   Does not apply See Admin Instructions Milad Marlow DO        sodium chloride 0.9 % flush bag 25 mL  25 mL Intravenous PRN Carlos Alberto MD        dextrose 50 % injection 25 g  25 g Intravenous PRN Carlos Alberto MD        dextrose 50 % injection 12.5 g  12.5 g Intravenous Carlos Issa MD   12.5 g at 01/26/24 0547    glucagon  (GLUCAGEN) injection 1 mg  1 mg Intramuscular PRN Carlos Alberto MD        heparin (porcine) injection 5,000 Units  5,000 Units Subcutaneous 3 times per day Carlos Alberto MD   5,000 Units at 02/01/24 0533    insulin lispro (ADMELOG,HumaLOG) - Correction Dose   Subcutaneous 4 times per day Carlos Alberto MD   1 Units at 01/31/24 1811    polyethylene glycol (MIRALAX) packet 17 g  17 g Per NG Tube Daily PRN Concepción Major DO        Or    bisacodyl (DULCOLAX) suppository 10 mg  10 mg Rectal PRN Pedrito Concepción A DO        sodium chloride 0.9 % injection 10 mL  10 mL Intracatheter PRN Edis Monique MD        dextrose (GLUTOSE) 40 % gel 15 g  15 g Per NG Tube PRN Carlos Alberto MD        dextrose (GLUTOSE) 40 % gel 30 g  30 g Per NG Tube PRN Carlos Alberto MD            Last Recorded Vitals  Visit Vitals  /80 (BP Location: RUE - Right upper extremity, Patient Position: Semi-Cole's)   Pulse 94   Temp 98.2 °F (36.8 °C) (Axillary)   Resp 18   Ht 5' 10\" (1.778 m)   Wt 85.5 kg (188 lb 7.9 oz)   SpO2 100%   BMI 27.05 kg/m²         SpO2 Readings from Last 3 Encounters:   02/01/24 100%   12/10/22 99%          Physical Exam:  Visit Vitals  /80 (BP Location: RUE - Right upper extremity, Patient Position: Semi-Cole's)   Pulse 94   Temp 98.2 °F (36.8 °C) (Axillary)   Resp 18   Ht 5' 10\" (1.778 m)   Wt 85.5 kg (188 lb 7.9 oz)   SpO2 100%   BMI 27.05 kg/m²         I/O's    Intake/Output Summary (Last 24 hours) at 2/1/2024 1148  Last data filed at 1/31/2024 1510  Gross per 24 hour   Intake --   Output 1500 ml   Net -1500 ml         Labs     Recent Results (from the past 24 hour(s))   GLUCOSE, BEDSIDE - POINT OF CARE    Collection Time: 01/31/24  5:56 PM   Result Value Ref Range    GLUCOSE, BEDSIDE - POINT OF CARE 168 (H) 70 - 99 mg/dL   GLUCOSE, BEDSIDE - POINT OF CARE    Collection Time: 01/31/24  7:43 PM   Result Value Ref Range    GLUCOSE, BEDSIDE - POINT OF CARE 193 (H) 70 -  99 mg/dL   CBC No Differential    Collection Time: 02/01/24 12:01 AM   Result Value Ref Range    WBC 11.8 (H) 4.2 - 11.0 K/mcL    RBC 4.07 (L) 4.50 - 5.90 mil/mcL    HGB 12.1 (L) 13.0 - 17.0 g/dL    HCT 39.2 39.0 - 51.0 %    MCV 96.3 78.0 - 100.0 fl    MCH 29.7 26.0 - 34.0 pg    MCHC 30.9 (L) 32.0 - 36.5 g/dL     140 - 450 K/mcL    RDW-CV 16.3 (H) 11.0 - 15.0 %    RDW-SD 57.2 (H) 39.0 - 50.0 fL    NRBC 0 <=0 /100 WBC   GLUCOSE, BEDSIDE - POINT OF CARE    Collection Time: 02/01/24 12:32 AM   Result Value Ref Range    GLUCOSE, BEDSIDE - POINT OF CARE 135 (H) 70 - 99 mg/dL   GLUCOSE, BEDSIDE - POINT OF CARE    Collection Time: 02/01/24  5:55 AM   Result Value Ref Range    GLUCOSE, BEDSIDE - POINT OF CARE 138 (H) 70 - 99 mg/dL       Imaging    FL VIDEO SWALLOW   Final Result   FINDINGS/IMPRESSION:   Imaging detail less than optimal due to shoulders projecting in the   field-of-view   No keiry tracheal aspiration observed during the study   Limited visualized cervical esophagus demonstrates no constricting or   obstructing or concerning mucosal abnormality.      PLEASE SEE DETAILED SPEECH PATHOLOGY DEPARTMENT REPORT FOR RECOMMENDATIONS            Electronically Signed by: RUBEN LOPEZ MD    Signed on: 1/30/2024 5:31 PM    Workstation ID: [id number]      IR PERMANENT DIALYSIS CATHETER INSERTION AGE 6 OR OLDER   Final Result   Impression: Successful tunneled hemodialysis catheter placement. Catheter   may be used now.      Electronically Signed by: DIANNA CASTREJON MD    Signed on: 1/29/2024 9:01 AM    Workstation ID: [id number]      XR ABDOMEN 1 VIEW   Final Result   FINDINGS/IMPRESSION:        Supine exam of the upper abdomenwas performed. Feeding tube with its tip   overlying the gastric body. IVC filter in the right paravertebral region at   the level of L2-3.         Electronically Signed by: MAKENZIE MADRIGAL MD    Signed on: 1/25/2024 10:15 AM    Workstation ID: [id number]      MRI BRAIN W WO CONTRAST    Final Result      Study is degraded by motion.      Multiple posterior circulation infarcts, including acute left PCA territory   infarct involving the left temporal and occipital lobes and a small, acute   left cerebellar hemisphere infarct. Subacute infarct involving the inferior   right cerebellar hemisphere, in the right PICA territory distribution.      Intervertebral disc fluid signal intensity in adjacent endplate marrow   edema at the C4-C5 level. There is no definite surrounding paraspinal edema   or enhancement. Findings are nonspecific, but may reflect degenerative   change. Early discitis osteomyelitis cannot be excluded. If there is   persistent clinical concern for discitis osteomyelitis, consider follow-up   imaging in 5 to 7 days.      No paraspinal or epidural fluid collection.      Chronic odontoid fracture with unchanged posterior angulation of the   proximal fracture fragment, which contributes to moderate spinal canal   stenosis at the C1 level. No abnormal cord signal intensity, within the   limits of motion degradation.      Multilevel degenerative changes of the cervical spine. There is moderate   spinal canal stenosis at C4-C5. Evaluation of neural foraminal stenosis is   degraded by motion.            Electronically Signed by: HOLLY HUNTER M.D.    Signed on: 1/24/2024 6:19 PM    Workstation ID: [id number]      MRI CERVICAL SPINE W WO CONTRAST   Final Result      Study is degraded by motion.      Multiple posterior circulation infarcts, including acute left PCA territory   infarct involving the left temporal and occipital lobes and a small, acute   left cerebellar hemisphere infarct. Subacute infarct involving the inferior   right cerebellar hemisphere, in the right PICA territory distribution.      Intervertebral disc fluid signal intensity in adjacent endplate marrow   edema at the C4-C5 level. There is no definite surrounding paraspinal edema   or enhancement. Findings are  nonspecific, but may reflect degenerative   change. Early discitis osteomyelitis cannot be excluded. If there is   persistent clinical concern for discitis osteomyelitis, consider follow-up   imaging in 5 to 7 days.      No paraspinal or epidural fluid collection.      Chronic odontoid fracture with unchanged posterior angulation of the   proximal fracture fragment, which contributes to moderate spinal canal   stenosis at the C1 level. No abnormal cord signal intensity, within the   limits of motion degradation.      Multilevel degenerative changes of the cervical spine. There is moderate   spinal canal stenosis at C4-C5. Evaluation of neural foraminal stenosis is   degraded by motion.            Electronically Signed by: HOLLY HUNTER M.D.    Signed on: 1/24/2024 6:19 PM    Workstation ID: [id number]      XR NASOGASTRIC TUBE CHECK ABDOMEN   Final Result   FINDINGS/IMPRESSION:        Supine exam of the upper abdomenwas performed. NG tube with its tip   overlying the left upper quadrant/gastric body. Tip of the right IJ   catheter overlying the right atrium.         Electronically Signed by: MAKENZIE MADRIGAL MD    Signed on: 1/23/2024 1:18 PM    Workstation ID: [id number]      XR FOOT 3 OR MORE VIEWS LEFT   Final Result   1.  Limited due to lack of comparison imaging.   2.  Probable osteomyelitis above involving second-fourth metatarsal heads    and proximal phalanges, possibly fourth proximal metatarsal..   3.  Edema and possible soft tissue emphysema.   4.  Chronicity uncertain first proximal phalanx transverse fracture.   5.  Consider CT or MRI.            Electronically signed by Emiliano Torrez MD on 01 23 24 at 01:14      CTA HEAD AND NECK LEVEL 1   Final Result      Severe multifocal atherosclerotic disease, with occlusion of the left   vertebral artery at the level of C2 and reconstitution of the left V4   segment likely on the basis of retrograde or collateral flow. Severe   narrowing of the right  vertebral artery origin and moderate narrowing of   the right V4 segment. Tapering of the left cervical ICA with diminutive   upper cervical segment may represent chronic dissection versus vasospasm.    Occluded versus hypoplastic left A1 LALITA segment. Distal left LALITA is patent.   Moderate to severe multifocal narrowing of the left PCA. Moderate narrowing   of the right PCA.      Significant hypoperfusion throughout the left inferior cerebrum likely   reflects combination of severely narrowed left ICA and occluded left   vertebral artery as well as severely narrowed left PCA. Large core infarct   within the left occipital region grossly corresponding to noncontrast CT   finding.      Chronic fractures of C1 and C2 with posterior angulation of the odontoid   tip, also described on outside CT cervical spine report from 11/19/2023.      Erosive change within the endplates at C4-C5 with mild loss of height at   C4, not described on outside CT exam from 11/19/2023. This may represent   new discitis osteomyelitis. Consider contrast-enhanced MRI cervical spine   for further evaluation.         KEY FINDINGS WERE COMMUNICATED BY DR. KRISHNA TO DR. KRISTAN DAY AT   1/22/2024 11:45 PM.      Electronically Signed by: TERRI KRISHNA M.D.    Signed on: 1/22/2024 11:50 PM    Workstation ID: [id number]      CT CEREBRAL PERFUSION W CONTRAST LEVEL 1   Final Result      Severe multifocal atherosclerotic disease, with occlusion of the left   vertebral artery at the level of C2 and reconstitution of the left V4   segment likely on the basis of retrograde or collateral flow. Severe   narrowing of the right vertebral artery origin and moderate narrowing of   the right V4 segment. Tapering of the left cervical ICA with diminutive   upper cervical segment may represent chronic dissection versus vasospasm.    Occluded versus hypoplastic left A1 LALITA segment. Distal left LALITA is patent.   Moderate to severe multifocal narrowing of  the left PCA. Moderate narrowing   of the right PCA.      Significant hypoperfusion throughout the left inferior cerebrum likely   reflects combination of severely narrowed left ICA and occluded left   vertebral artery as well as severely narrowed left PCA. Large core infarct   within the left occipital region grossly corresponding to noncontrast CT   finding.      Chronic fractures of C1 and C2 with posterior angulation of the odontoid   tip, also described on outside CT cervical spine report from 11/19/2023.      Erosive change within the endplates at C4-C5 with mild loss of height at   C4, not described on outside CT exam from 11/19/2023. This may represent   new discitis osteomyelitis. Consider contrast-enhanced MRI cervical spine   for further evaluation.         KEY FINDINGS WERE COMMUNICATED BY DR. KRISHNA TO DR. KRISTAN DAY AT   1/22/2024 11:45 PM.      Electronically Signed by: TERRI KRISHNA M.D.    Signed on: 1/22/2024 11:50 PM    Workstation ID: [id number]      CT HEAD WO CONTRAST   Final Result      Acute or early subacute infarct in the left occipital lobe without   hemorrhagic transformation or significant mass effect.      Chronic displaced fractures of C1 and C2 which are new from 12/10/2022 but   were described on outside CT report from 11/19/2023.      Electronically Signed by: TERRI KRISHNA M.D.    Signed on: 1/22/2024 9:48 PM    Workstation ID: [id number]      CTA CHEST ABDOMEN PELVIS   Final Result      1. No acute aortic pathology. Finding on prior chest radiograph corresponds   to bulky calcified lymph nodes from prior granulomatous disease.   2. Heterogeneous multinodular thyroid gland. Consider nonemergent thyroid   ultrasound, if not previously evaluated.   3. Bilateral calcified lung nodules and others that are too small to   definitively characterize. Recommend follow-up CT chest in 3 to 6 months to   assess stability.   4. Tiny micronodules in the left lower lobe  and diffuse bronchial wall   thickening, may reflect aspiration or infection.   5. Occluded right superficial femoral artery.   6. Moderate stenosis of the bilateral renal arteries with bilateral renal   atrophy.   7. Additional details as above.      Electronically Signed by: NEFTALY GARCIA MD    Signed on: 1/22/2024 10:00 PM    Workstation ID: [id number]      XR CHEST AP OR PA   Final Result   Impression:    Apparent widening of the upper mediastinum may represent a tortuous   thoracic aorta or aneurysm. Recommend attention on subsequent CT chest   (ordered at time of dictation). Bilateral nodular lung opacities, some of   which may represent calcified granulomas. Support devices as above.      Electronically Signed by: NEFTALY GARCIA MD    Signed on: 1/22/2024 8:09 PM    Workstation ID: [id number]      IR PERMANENT DIALYSIS CATHETER REMOVAL    (Results Pending)       Cultures  Microbiology Results       None               Principal Problem:    Sepsis (CMD)         Assessment/Plan:        Primary Care Physician  Augusta Ann MD    Code Status    Code Status: Full Resuscitation    Ivanna Akbar MD  AM Hospitalist  2/1/2024 11:48 AM          wound care   antibiotics   O2 protocol   respiratory protocol   ID service follow-up   nephrology service follow-up   neurology service follow-up   physical therapy   nutritional support   follow-up cultures leukocyte temperature curve   antibiotics per ID service plan   bronchodilator as needed   follow-up lactic acid level   GI prophylaxis / PPI   HD cath placement  Labs / notes noted   d/w pt and rn  Discharge per all to [facility]   op f/u       Primary Care Physician  Augusta Ann MD    Code Status    Code Status: Full Resuscitation    2/1/2024 11:48 AM          [FreeTextEntry1] : Bone Density July 27, 2022\par Indication vs 2020 Asses response to medication \par Spine: not performed\par Total Hip-1.9 osteopenia , no significant change \par Femoral Neck -2.2 osteopenia , no significant change \par Proximal Radius -1.7 osteopenia no significant change \par \par Bone mineral density July 2, 2020\par Compared to 2018\par Spine not performed\par Total hip -2.0 osteopenia no significant change\par Femoral neck -2.3 osteopenia no significant change\par Proximal radius -1.9 osteopenia no significant change\par \par Bone mineral density 2/20/18\par indication: assess response to medication \par spine not performed due to DJD \par total hip -1.9 osteopenia, no significant change\par femoral neck -2.5 osteoporosis, no significant change \par proximal radius -1.7  osteopenia, no significant change \par \par Bone mineral density performed December 13, 2016\par Indication  measure response to medication\par Spine not performed\par Total hip -2.0, osteopenia, no significant change\par Femoral neck -2.7, osteoporosis, -4.2% which is not statistically significant\par Proximal radius -1.7, osteopenia, no significant change\par \par bone mineral density test performed May 5, 2015\par spine not analyzed due to scoliosis\par Total hip -2.1, osteopenia\par Femoral neck -2.5, osteoporosis\par Proximal radius -1.9, osteopenia

## 2024-02-08 ENCOUNTER — NON-APPOINTMENT (OUTPATIENT)
Age: 89
End: 2024-02-08

## 2024-02-08 ENCOUNTER — APPOINTMENT (OUTPATIENT)
Dept: CARDIOLOGY | Facility: CLINIC | Age: 89
End: 2024-02-08
Payer: MEDICARE

## 2024-02-08 VITALS
OXYGEN SATURATION: 97 % | SYSTOLIC BLOOD PRESSURE: 120 MMHG | WEIGHT: 134 LBS | HEIGHT: 64 IN | DIASTOLIC BLOOD PRESSURE: 88 MMHG | BODY MASS INDEX: 22.88 KG/M2 | HEART RATE: 67 BPM

## 2024-02-08 DIAGNOSIS — R53.82 CHRONIC FATIGUE, UNSPECIFIED: ICD-10-CM

## 2024-02-08 DIAGNOSIS — I10 ESSENTIAL (PRIMARY) HYPERTENSION: ICD-10-CM

## 2024-02-08 DIAGNOSIS — Z00.00 ENCOUNTER FOR GENERAL ADULT MEDICAL EXAMINATION W/OUT ABNORMAL FINDINGS: ICD-10-CM

## 2024-02-08 DIAGNOSIS — E78.00 PURE HYPERCHOLESTEROLEMIA, UNSPECIFIED: ICD-10-CM

## 2024-02-08 PROCEDURE — 93000 ELECTROCARDIOGRAM COMPLETE: CPT

## 2024-02-08 PROCEDURE — 99215 OFFICE O/P EST HI 40 MIN: CPT

## 2024-02-08 PROCEDURE — G2211 COMPLEX E/M VISIT ADD ON: CPT

## 2024-02-08 RX ORDER — TORSEMIDE 20 MG/1
20 TABLET ORAL DAILY
Qty: 90 | Refills: 0 | Status: ACTIVE | COMMUNITY
Start: 2024-02-08

## 2024-02-08 RX ORDER — FUROSEMIDE 40 MG/1
40 TABLET ORAL
Qty: 90 | Refills: 0 | Status: DISCONTINUED | COMMUNITY
End: 2024-02-08

## 2024-02-08 NOTE — REASON FOR VISIT
[TextEntry] : The patient is here today for follow-up of hypertension, hypercholesterolemia, heart failure with preserved ejection fraction and unexplained weight loss.  The patient is being followed at [facility] for her heart failure.  She is on torsemide 20 mg daily.  Over the past few months, the patient has been losing weight despite reasonably good appetite.  Compared to her last visit several months ago, she is down over 10 pounds.  The patient describes on 2 weeks of right shoulder pain with no apparent trauma.  X-ray will be ordered.  Otherwise, the patient has no other localizing symptoms.  She does describe occasional shortness of breath even on the torsemide but it is generally well-controlled.

## 2024-02-10 LAB
25(OH)D3 SERPL-MCNC: 82.1 NG/ML
ALBUMIN SERPL ELPH-MCNC: 4.4 G/DL
ALP BLD-CCNC: 56 U/L
ALT SERPL-CCNC: 13 U/L
ANION GAP SERPL CALC-SCNC: 13 MMOL/L
AST SERPL-CCNC: 21 U/L
BILIRUB SERPL-MCNC: 0.3 MG/DL
BUN SERPL-MCNC: 22 MG/DL
CALCIUM SERPL-MCNC: 10.1 MG/DL
CHLORIDE SERPL-SCNC: 98 MMOL/L
CHOLEST SERPL-MCNC: 220 MG/DL
CO2 SERPL-SCNC: 26 MMOL/L
CREAT SERPL-MCNC: 1.13 MG/DL
EGFR: 47 ML/MIN/1.73M2
ESTIMATED AVERAGE GLUCOSE: 114 MG/DL
GLUCOSE SERPL-MCNC: 91 MG/DL
HBA1C MFR BLD HPLC: 5.6 %
HCT VFR BLD CALC: 38.6 %
HDLC SERPL-MCNC: 59 MG/DL
HGB BLD-MCNC: 12.8 G/DL
LDLC SERPL CALC-MCNC: 138 MG/DL
MCHC RBC-ENTMCNC: 30.6 PG
MCHC RBC-ENTMCNC: 33.2 GM/DL
MCV RBC AUTO: 92.3 FL
NONHDLC SERPL-MCNC: 161 MG/DL
PLATELET # BLD AUTO: 274 K/UL
POTASSIUM SERPL-SCNC: 4.7 MMOL/L
PROT SERPL-MCNC: 6.7 G/DL
RBC # BLD: 4.18 M/UL
RBC # FLD: 13.2 %
SODIUM SERPL-SCNC: 138 MMOL/L
T4 FREE SERPL-MCNC: 1.4 NG/DL
TRIGL SERPL-MCNC: 130 MG/DL
TSH SERPL-ACNC: 0.55 UIU/ML
WBC # FLD AUTO: 4.49 K/UL

## 2024-02-15 RX ORDER — TRAZODONE HYDROCHLORIDE 50 MG/1
50 TABLET ORAL
Qty: 90 | Refills: 3 | Status: ACTIVE | COMMUNITY
Start: 2022-11-08 | End: 1900-01-01

## 2024-02-21 ENCOUNTER — APPOINTMENT (OUTPATIENT)
Dept: ENDOCRINOLOGY | Facility: CLINIC | Age: 89
End: 2024-02-21
Payer: MEDICARE

## 2024-02-21 VITALS
HEART RATE: 59 BPM | SYSTOLIC BLOOD PRESSURE: 132 MMHG | BODY MASS INDEX: 21.28 KG/M2 | WEIGHT: 124 LBS | OXYGEN SATURATION: 97 % | DIASTOLIC BLOOD PRESSURE: 80 MMHG

## 2024-02-21 DIAGNOSIS — L65.9 NONSCARRING HAIR LOSS, UNSPECIFIED: ICD-10-CM

## 2024-02-21 DIAGNOSIS — M81.0 AGE-RELATED OSTEOPOROSIS W/OUT CURRENT PATHOLOGICAL FRACTURE: ICD-10-CM

## 2024-02-21 PROCEDURE — 96372 THER/PROPH/DIAG INJ SC/IM: CPT

## 2024-02-21 PROCEDURE — 99214 OFFICE O/P EST MOD 30 MIN: CPT | Mod: 25

## 2024-02-21 RX ORDER — MINOXIDIL 2.5 MG/1
2.5 TABLET ORAL
Qty: 45 | Refills: 0 | Status: ACTIVE | COMMUNITY
Start: 2024-02-21 | End: 1900-01-01

## 2024-02-21 RX ORDER — DAPAGLIFLOZIN 10 MG/1
10 TABLET, FILM COATED ORAL
Qty: 90 | Refills: 1 | Status: DISCONTINUED | COMMUNITY
Start: 2023-05-22 | End: 2024-02-21

## 2024-02-21 RX ORDER — DENOSUMAB 60 MG/ML
60 INJECTION SUBCUTANEOUS
Qty: 1 | Refills: 0 | Status: COMPLETED | OUTPATIENT
Start: 2024-02-21

## 2024-02-21 RX ADMIN — DENOSUMAB 60 MG/ML: 60 INJECTION SUBCUTANEOUS at 00:00

## 2024-02-22 NOTE — HISTORY OF PRESENT ILLNESS
[Alendronate (Fosomax)] : Alendronate [Ibandronate (Boniva)] : Ibandronate [Denosumab (Prolia)] : Denosumab [FreeTextEntry1] : Patient returns for a follow up visit for osteoporosis. Patient is accompanied with her daughter.   Patient complains of hair loss and consulted dermatologist. Patient reports allergy to topical Minoxidil, referred to Dr. Madison for this complaint. She stopped drug dapagliflozin long time ago. Patient also complains of  weight loss.    Told of low bone density for many years. She took Fosamax in the past and then took Boniva for some period of time although she does not remember how long she was on these medications. She believes she had been off all medications for more than 6 years.  She suffered back pain in August of 2014 without obvious trauma. X-rays reportedly revealed and L3 compression fx. A repeat MRI  in 2015 did not show any evidence of prior fracture. There is significant degenerative changes and disc disease in the back and a scoliosis which may have been misinterpreted as fracture. She does have back pain. saw pain management, had epidural. No history of other fractures. Began Prolia 2015. Tolerating well. No thigh pain, no interval fx. Normal Ca. BMD fem neck sl decrease in 12/2016 reversed in 2/2018, stable at all sites. BMD 7/2020 stable. UP to date with DDS. No ONJ. Not planning major dental work.   Pt daughter requested more aggressive treatment based on outside imaging from ortho. Patient was told that she has osteoporosis on bone density testing of the spine. She has significant progressive curvature and was told by orthopedic surgery to use Forteo rather than continue Prolia. I have personally reviewed outside bone density as Ethan. Spine bone density was falsely elevated due to arthritis not at all osteoporotic. Plain x-rays of the spine reported marked kyphoscoliosis with no vertebral compression fractures. PMHx: Has right sciatica type numbness. Pt had laparoscopic appendectomy 9/2021 at [facility], initially had drain placed 7/2021. S/p she had some fluid in her lungs, resolved on Lasix.

## 2024-02-22 NOTE — END OF VISIT
[FreeTextEntry3] : I, Sea Bradley, authored this note working as a medical scribe for Dr. Piña.  02/21/2024.  This note was authored by the medical scribe for me. I have reviewed, edited, and revised the note as needed. I am in agreement with the exam findings, imaging findings, and treatment plan.  Luis Piña MD

## 2024-02-22 NOTE — REVIEW OF SYSTEMS
[Constipation] : constipation [Negative] : Heme/Lymph [As Noted in HPI] : as noted in HPI [Recent Weight Loss (___ Lbs)] : recent weight loss: [unfilled] lbs

## 2024-02-22 NOTE — PAST MEDICAL HISTORY
[Surgical Menopause] : The patient is in surgical menopause [Menopause Age____] : age at menopause was [unfilled] [History of Hormone Replacement Treatment] : has a history of hormone replacement treatment [de-identified] : took HR x 11 years

## 2024-02-22 NOTE — ASSESSMENT
[Denosumab Therapy] : Risks  and benefits of denosumab therapy were discussed with the patient including eczema, cellulitis, osteonecrosis of the jaw and atypical femur fractures [FreeTextEntry1] : 89 year-old female with osteoporosis .   Initially told of low bone density consistent with osteoporosis despite many years of Fosamax and Boniva. Possible spine compression fx in 2014. Began Prolia 2015. Tolerating well. No thigh pain, no interval fx. Normal Ca. No ONJ. BMD fem neck sl decrease in 12/2016 reversed in 2/2018, stable at all sites. BMD 7/2020 stable. BMD July 2022 showed that osteopenia in present in all sites. Overall improvement in bone density. Patient has been given Prolia dose in the office today 2/21/24.  Continue Prolia, buy and bill.    Marked kyphoscoliosis, stable on physical examination, unable to get out of chair without assistance.   Patients' daughter will consult dermatologist for the hair loss.  Patients' daughter is worried about her weight loss. Patient is advised to consult a dietician and her PCP. She is concern if heart failure medicine causes wt loss, pt has been educated about it.  F/u in 6 months. BMD next year

## 2024-02-22 NOTE — PHYSICAL EXAM
[Alert] : alert [Well Nourished] : well nourished [No Acute Distress] : no acute distress [Well Developed] : well developed [Normal Sclera/Conjunctiva] : normal sclera/conjunctiva [EOMI] : extra ocular movement intact [No Proptosis] : no proptosis [Thyroid Not Enlarged] : the thyroid was not enlarged [No Thyroid Nodules] : no palpable thyroid nodules [Clear to Auscultation] : lungs were clear to auscultation bilaterally [Normal S1, S2] : normal S1 and S2 [Normal Rate] : heart rate was normal [Regular Rhythm] : with a regular rhythm [No Edema] : no peripheral edema [Normal Bowel Sounds] : normal bowel sounds [Not Tender] : non-tender [Not Distended] : not distended [Soft] : abdomen soft [Normal Anterior Cervical Nodes] : no anterior cervical lymphadenopathy [Kyphosis] : kyphosis present [Scoliosis] : scoliosis present [No Stigmata of Cushings Syndrome] : no stigmata of Cushings Syndrome [Normal Reflexes] : deep tendon reflexes were 2+ and symmetric [No Tremors] : no tremors [Oriented x3] : oriented to person, place, and time [de-identified] : She is unable to rise from chair, using walker, which is independent factor for future fall.  [de-identified] : Marked Kyphoscoliosis [de-identified] : Male pattern alopecia

## 2024-02-22 NOTE — PROCEDURE
[FreeTextEntry1] : Bone Density July 27, 2022 Indication vs 2020 Asses response to medication  Spine: not performed Total Hip: -1.9 osteopenia , no significant change  Femoral Neck -2.2 osteopenia , no significant change  Proximal Radius: -1.7 osteopenia, no significant change   Bone mineral density July 2, 2020 Compared to 2018 Spine not performed Total hip -2.0 osteopenia no significant change Femoral neck -2.3 osteopenia no significant change Proximal radius -1.9 osteopenia no significant change  Bone mineral density 2/20/18 indication: assess response to medication  spine not performed due to DJD  total hip -1.9 osteopenia, no significant change femoral neck -2.5 osteoporosis, no significant change  proximal radius -1.7  osteopenia, no significant change   Bone mineral density performed December 13, 2016 Indication  measure response to medication Spine not performed Total hip -2.0, osteopenia, no significant change Femoral neck -2.7, osteoporosis, -4.2% which is not statistically significant Proximal radius -1.7, osteopenia, no significant change  bone mineral density test performed May 5, 2015 spine not analyzed due to scoliosis Total hip -2.1, osteopenia Femoral neck -2.5, osteoporosis Proximal radius -1.9, osteopenia

## 2024-03-12 ENCOUNTER — APPOINTMENT (OUTPATIENT)
Dept: CARDIOLOGY | Facility: CLINIC | Age: 89
End: 2024-03-12
Payer: MEDICARE

## 2024-03-12 VITALS
DIASTOLIC BLOOD PRESSURE: 80 MMHG | HEIGHT: 64 IN | OXYGEN SATURATION: 100 % | BODY MASS INDEX: 21.68 KG/M2 | HEART RATE: 85 BPM | WEIGHT: 127 LBS | SYSTOLIC BLOOD PRESSURE: 138 MMHG

## 2024-03-12 VITALS — TEMPERATURE: 98.4 F

## 2024-03-12 DIAGNOSIS — I50.30 UNSPECIFIED DIASTOLIC (CONGESTIVE) HEART FAILURE: ICD-10-CM

## 2024-03-12 DIAGNOSIS — M79.89 OTHER SPECIFIED SOFT TISSUE DISORDERS: ICD-10-CM

## 2024-03-12 DIAGNOSIS — R63.4 ABNORMAL WEIGHT LOSS: ICD-10-CM

## 2024-03-12 PROCEDURE — G2211 COMPLEX E/M VISIT ADD ON: CPT

## 2024-03-12 PROCEDURE — 99214 OFFICE O/P EST MOD 30 MIN: CPT

## 2024-03-12 RX ORDER — SACUBITRIL AND VALSARTAN 24; 26 MG/1; MG/1
24-26 TABLET, FILM COATED ORAL TWICE DAILY
Qty: 180 | Refills: 2 | Status: ACTIVE | COMMUNITY
Start: 2021-09-02

## 2024-03-12 NOTE — DISCUSSION/SUMMARY
[FreeTextEntry1] : Heart failure preserved ejection fraction-begin Entresto 24/26 p.o. twice daily.  Patient will continue to monitor to see whether her leg swelling improves.  Hold on metoprolol because of ongoing fatigue as well as torsemide and furosemide and ethacrynic acid for now. Right hip pain-hip x-ray ordered Return visit 1 month   Total time of the encounter: 30 minutes which included but was not limited to the following: Face-to-face and non face-to-face time personally spent by the physician preparing to see the patient, obtaining and/or resuming separately obtained history, performing a medically appropriate examination and/or evaluation, counseling and educating the patient/family/caregiver, ordering medications, tests or procedures, referring and communicating with other healthcare professionals, documenting clinical information in the electronic health record, independently interpreting results and communicated results to the patient/family/caregiver and care coordination.

## 2024-03-12 NOTE — REASON FOR VISIT
[FreeTextEntry1] : The patient is here today for follow-up of weight loss, heart failure with preserved ejection fraction and right hip pain.  The patient was seen here a month ago because of ongoing weight loss.  Recently, her weight is up but it is because she stopped taking torsemide and furosemide because she has been feeling fatigue after she takes it and she is starting to retain some fluid.  The patient was recently started on ethacrynic acid 25 mg daily but this does not seem to be helping.  Of note, the patient does have a history of heart failure with preserved ejection fraction and has been on Entresto but stopped it because of cost.  Because of the patient's suppose it sulfa allergy which seems to be limiting her ability to use most diuretics, I will try reinstituting Entresto to see whether this provides some relief to the leg swelling.  The patient also describes difficulties with right hip pain.  X-ray will be ordered.

## 2024-04-03 DIAGNOSIS — M25.551 PAIN IN RIGHT HIP: ICD-10-CM

## 2024-04-03 DIAGNOSIS — M13.0 POLYARTHRITIS, UNSPECIFIED: ICD-10-CM

## 2024-04-03 DIAGNOSIS — M48.00 SPINAL STENOSIS, SITE UNSPECIFIED: ICD-10-CM

## 2024-04-03 DIAGNOSIS — M25.511 PAIN IN RIGHT SHOULDER: ICD-10-CM

## 2024-05-16 ENCOUNTER — APPOINTMENT (OUTPATIENT)
Dept: CARDIOLOGY | Facility: CLINIC | Age: 89
End: 2024-05-16

## 2024-06-07 ENCOUNTER — APPOINTMENT (OUTPATIENT)
Dept: ORTHOPEDIC SURGERY | Facility: CLINIC | Age: 89
End: 2024-06-07

## 2024-09-04 ENCOUNTER — APPOINTMENT (OUTPATIENT)
Dept: ENDOCRINOLOGY | Facility: CLINIC | Age: 89
End: 2024-09-04
Payer: MEDICARE

## 2024-09-04 VITALS
DIASTOLIC BLOOD PRESSURE: 64 MMHG | HEART RATE: 80 BPM | BODY MASS INDEX: 27.03 KG/M2 | OXYGEN SATURATION: 98 % | WEIGHT: 127 LBS | SYSTOLIC BLOOD PRESSURE: 100 MMHG | HEIGHT: 57.5 IN

## 2024-09-04 DIAGNOSIS — M40.209 UNSPECIFIED KYPHOSIS, SITE UNSPECIFIED: ICD-10-CM

## 2024-09-04 DIAGNOSIS — M81.0 AGE-RELATED OSTEOPOROSIS W/OUT CURRENT PATHOLOGICAL FRACTURE: ICD-10-CM

## 2024-09-04 PROCEDURE — 96372 THER/PROPH/DIAG INJ SC/IM: CPT

## 2024-09-04 PROCEDURE — 77080 DXA BONE DENSITY AXIAL: CPT | Mod: GA

## 2024-09-04 PROCEDURE — 99214 OFFICE O/P EST MOD 30 MIN: CPT | Mod: 25

## 2024-09-04 RX ORDER — DENOSUMAB 60 MG/ML
60 INJECTION SUBCUTANEOUS
Qty: 1 | Refills: 0 | Status: COMPLETED | OUTPATIENT
Start: 2024-09-04

## 2024-09-04 RX ADMIN — DENOSUMAB 60 MG/ML: 60 INJECTION SUBCUTANEOUS at 00:00

## 2024-09-04 NOTE — REVIEW OF SYSTEMS
[Recent Weight Loss (___ Lbs)] : recent weight loss: [unfilled] lbs [As Noted in HPI] : as noted in HPI [Constipation] : constipation [Negative] : Heme/Lymph

## 2024-09-05 NOTE — END OF VISIT
[FreeTextEntry3] :  This note was written by Ratna Velazquez on (September 04, 2024) acting as a medical scribe for Dr. Piña This note was authored by the medical scribe for me. I have reviewed, edited, and revised the note as needed. I am in agreement with the exam findings, imaging findings, and treatment plan.  Luis Piña MD

## 2024-09-05 NOTE — PROCEDURE
[FreeTextEntry1] : Bone Mineral Density: 09/04/2024 Indication: Comparison to 2022, assess response to medication Spine: not performed  Total hip: -2.0, osteopenia, no significant change Femoral neck: -2.1, osteopenia, no significant change Proximal radius:  -1.3, osteopenia, no significant change, +6.1% vs baseline  Bone Density July 27, 2022 Indication vs 2020, Assess response to medication  Spine: not performed Total Hip: -1.9, osteopenia, no significant change  Femoral Neck -2.2, osteopenia, no significant change  Proximal Radius: -1.7, osteopenia, no significant change   Bone mineral density July 2, 2020 Compared to 2018 Spine not performed Total hip -2.0 osteopenia no significant change Femoral neck -2.3 osteopenia no significant change Proximal radius -1.9 osteopenia no significant change  Bone mineral density 2/20/18 indication: assess response to medication  spine not performed due to DJD  total hip -1.9 osteopenia, no significant change femoral neck -2.5 osteoporosis, no significant change  proximal radius -1.7, osteopenia, no significant change   Bone mineral density performed December 13, 2016 Indication: measure response to medication Spine not performed Total hip -2.0, osteopenia, no significant change Femoral neck -2.7, osteoporosis, -4.2% which is not statistically significant Proximal radius -1.7, osteopenia, no significant change  bone mineral density test performed May 5, 2015 spine not analyzed due to scoliosis Total hip -2.1, osteopenia Femoral neck -2.5, osteoporosis Proximal radius -1.9, osteopenia

## 2024-09-05 NOTE — HISTORY OF PRESENT ILLNESS
[Alendronate (Fosomax)] : Alendronate [Ibandronate (Boniva)] : Ibandronate [Denosumab (Prolia)] : Denosumab [FreeTextEntry1] : Patient returns for a follow up visit for osteoporosis. Pt began Prolia 05/2015.  Patient is accompanied with her daughter.  No interval health changes. No major surgeries, hospitalizations, fractures or changes in medication. Up to date with dentist. No major dental work planned.  Told of low bone density for many years. She took Fosamax in the past and then took Boniva for some period of time although she does not remember how long she was on these medications. She believes she had been off all medications for more than 6 years.  She suffered back pain in August of 2014 without obvious trauma. X-rays reportedly revealed and L3 compression fx. A repeat MRI  in 2015 did not show any evidence of prior fracture. There is significant degenerative changes and disc disease in the back and a scoliosis which may have been misinterpreted as fracture. She does have back pain. saw pain management, had epidural. No history of other fractures. . Began Prolia 05/2015. Tolerating well. No thigh pain, no interval fx. Normal Ca. No ONJ. BMD 12/2016 indicates stable osteopenia in total hip and prox. radius, worsened osteoporosis in fem neck. BMD results reviewed w/pt. BMD 02/2018 indicates stable osteopenia in total hip and prox. radius, stable osteoporosis in fem neck. BMD results reviewed w/pt. BMD 7/2020 indicates stable osteopenia in all sites. BMD results reviewed w/pt.  BMD 07/2022 showed that osteopenia in present in all sites. Overall improvement in bone density. BMD results reviewed w/pt. BMD 09/2024 indicates stable osteopenia in all sites, +6% increase vs baseline in prox. radius. BMD results reviewed w/pt.   Pt daughter requested more aggressive treatment based on outside imaging from ortho. Patient was told that she has osteoporosis on bone density testing of the spine. She has significant progressive curvature and was told by orthopedic surgery to use Forteo rather than continue Prolia. I have personally reviewed outside bone density at  [facility]. Spine bone density was falsely elevated due to arthritis not at all osteoporotic. Plain x-rays of the spine reported marked kyphoscoliosis with no vertebral compression fractures.  PMHx: Has right sciatica type numbness.

## 2024-09-05 NOTE — PHYSICAL EXAM
[Alert] : alert [Well Nourished] : well nourished [No Acute Distress] : no acute distress [Well Developed] : well developed [Normal Sclera/Conjunctiva] : normal sclera/conjunctiva [EOMI] : extra ocular movement intact [No Proptosis] : no proptosis [Thyroid Not Enlarged] : the thyroid was not enlarged [No Thyroid Nodules] : no palpable thyroid nodules [Clear to Auscultation] : lungs were clear to auscultation bilaterally [Normal S1, S2] : normal S1 and S2 [Normal Rate] : heart rate was normal [Regular Rhythm] : with a regular rhythm [No Edema] : no peripheral edema [Normal Bowel Sounds] : normal bowel sounds [Not Tender] : non-tender [Not Distended] : not distended [Soft] : abdomen soft [Normal Anterior Cervical Nodes] : no anterior cervical lymphadenopathy [Kyphosis] : kyphosis present [Scoliosis] : scoliosis present [No Stigmata of Cushings Syndrome] : no stigmata of Cushings Syndrome [Normal Reflexes] : deep tendon reflexes were 2+ and symmetric [No Tremors] : no tremors [Oriented x3] : oriented to person, place, and time [de-identified] : She is unable to rise from chair, using walker, which is independent factor for future fall.  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

## 2024-09-05 NOTE — PAST MEDICAL HISTORY
[Surgical Menopause] : The patient is in surgical menopause [Menopause Age____] : age at menopause was [unfilled] [History of Hormone Replacement Treatment] : has a history of hormone replacement treatment [de-identified] : took HR x 11 years

## 2024-09-05 NOTE — ASSESSMENT
[Denosumab Therapy] : Risks  and benefits of denosumab therapy were discussed with the patient including eczema, cellulitis, osteonecrosis of the jaw and atypical femur fractures [FreeTextEntry1] : 88 y/o female returns for a follow-up visit for osteoporosis.  Initially told of low bone density consistent with osteoporosis despite many years of Fosamax and Boniva. Possible spine compression fx in 2014. Began Prolia 05/2015.  . BMD 09/2024 indicates stable osteopenia in all sites, +6% increase vs baseline in prox. radius. BMD results reviewed w/pt.  Continue Prolia, buy and bill.  Significant kyphoscoliosis, stable on physical examination, unable to get out of chair without assistance.      Labs: February 2024  Creatinine: 1.13 Calcium: 10.1 Vitamin D: 82.1 TSH: 0.55 Free T4: 1.4  F/u in 6 months.

## 2024-09-05 NOTE — ASSESSMENT
[Denosumab Therapy] : Risks  and benefits of denosumab therapy were discussed with the patient including eczema, cellulitis, osteonecrosis of the jaw and atypical femur fractures [FreeTextEntry1] : 89 y/o female returns for a follow-up visit for osteoporosis.  Initially told of low bone density consistent with osteoporosis despite many years of Fosamax and Boniva. Possible spine compression fx in 2014. Began Prolia 05/2015.  . BMD 09/2024 indicates stable osteopenia in all sites, +6% increase vs baseline in prox. radius. BMD results reviewed w/pt.  Continue Prolia, buy and bill.  Significant kyphoscoliosis, stable on physical examination, unable to get out of chair without assistance.      Labs: February 2024  Creatinine: 1.13 Calcium: 10.1 Vitamin D: 82.1 TSH: 0.55 Free T4: 1.4  F/u in 6 months.

## 2024-09-05 NOTE — PHYSICAL EXAM
[Alert] : alert [Well Nourished] : well nourished [No Acute Distress] : no acute distress [Well Developed] : well developed [Normal Sclera/Conjunctiva] : normal sclera/conjunctiva [EOMI] : extra ocular movement intact [No Proptosis] : no proptosis [Thyroid Not Enlarged] : the thyroid was not enlarged [No Thyroid Nodules] : no palpable thyroid nodules [Clear to Auscultation] : lungs were clear to auscultation bilaterally [Normal S1, S2] : normal S1 and S2 [Normal Rate] : heart rate was normal [Regular Rhythm] : with a regular rhythm [No Edema] : no peripheral edema [Normal Bowel Sounds] : normal bowel sounds [Not Tender] : non-tender [Not Distended] : not distended [Soft] : abdomen soft [Normal Anterior Cervical Nodes] : no anterior cervical lymphadenopathy [Kyphosis] : kyphosis present [Scoliosis] : scoliosis present [No Stigmata of Cushings Syndrome] : no stigmata of Cushings Syndrome [Normal Reflexes] : deep tendon reflexes were 2+ and symmetric [No Tremors] : no tremors [Oriented x3] : oriented to person, place, and time [de-identified] : She is unable to rise from chair, using walker, which is independent factor for future fall.  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

## 2024-09-05 NOTE — HISTORY OF PRESENT ILLNESS
[Alendronate (Fosomax)] : Alendronate [Ibandronate (Boniva)] : Ibandronate [Denosumab (Prolia)] : Denosumab [FreeTextEntry1] : Patient returns for a follow up visit for osteoporosis. Pt began Prolia 05/2015.  Patient is accompanied with her daughter.  No interval health changes. No major surgeries, hospitalizations, fractures or changes in medication. Up to date with dentist. No major dental work planned.  Told of low bone density for many years. She took Fosamax in the past and then took Boniva for some period of time although she does not remember how long she was on these medications. She believes she had been off all medications for more than 6 years.  She suffered back pain in August of 2014 without obvious trauma. X-rays reportedly revealed and L3 compression fx. A repeat MRI  in 2015 did not show any evidence of prior fracture. There is significant degenerative changes and disc disease in the back and a scoliosis which may have been misinterpreted as fracture. She does have back pain. saw pain management, had epidural. No history of other fractures. . Began Prolia 05/2015. Tolerating well. No thigh pain, no interval fx. Normal Ca. No ONJ. BMD 12/2016 indicates stable osteopenia in total hip and prox. radius, worsened osteoporosis in fem neck. BMD results reviewed w/pt. BMD 02/2018 indicates stable osteopenia in total hip and prox. radius, stable osteoporosis in fem neck. BMD results reviewed w/pt. BMD 7/2020 indicates stable osteopenia in all sites. BMD results reviewed w/pt.  BMD 07/2022 showed that osteopenia in present in all sites. Overall improvement in bone density. BMD results reviewed w/pt. BMD 09/2024 indicates stable osteopenia in all sites, +6% increase vs baseline in prox. radius. BMD results reviewed w/pt.   Pt daughter requested more aggressive treatment based on outside imaging from ortho. Patient was told that she has osteoporosis on bone density testing of the spine. She has significant progressive curvature and was told by orthopedic surgery to use Forteo rather than continue Prolia. I have personally reviewed outside bone density at  [facility]. Spine bone density was falsely elevated due to arthritis not at all osteoporotic. Plain x-rays of the spine reported marked kyphoscoliosis with no vertebral compression fractures.  PMHx: Has right sciatica type numbness.

## 2024-09-05 NOTE — PAST MEDICAL HISTORY
[Surgical Menopause] : The patient is in surgical menopause [Menopause Age____] : age at menopause was [unfilled] [History of Hormone Replacement Treatment] : has a history of hormone replacement treatment [de-identified] : took HR x 11 years

## 2025-01-31 ENCOUNTER — NON-APPOINTMENT (OUTPATIENT)
Age: 89
End: 2025-01-31

## 2025-02-20 ENCOUNTER — APPOINTMENT (OUTPATIENT)
Dept: CARDIOLOGY | Facility: CLINIC | Age: 89
End: 2025-02-20
Payer: MEDICARE

## 2025-02-20 ENCOUNTER — NON-APPOINTMENT (OUTPATIENT)
Age: 89
End: 2025-02-20

## 2025-02-20 VITALS — DIASTOLIC BLOOD PRESSURE: 75 MMHG | HEART RATE: 74 BPM | OXYGEN SATURATION: 98 % | SYSTOLIC BLOOD PRESSURE: 117 MMHG

## 2025-02-20 VITALS — WEIGHT: 127 LBS | BODY MASS INDEX: 27.01 KG/M2

## 2025-02-20 DIAGNOSIS — M81.0 AGE-RELATED OSTEOPOROSIS W/OUT CURRENT PATHOLOGICAL FRACTURE: ICD-10-CM

## 2025-02-20 DIAGNOSIS — M48.00 SPINAL STENOSIS, SITE UNSPECIFIED: ICD-10-CM

## 2025-02-20 DIAGNOSIS — E78.00 PURE HYPERCHOLESTEROLEMIA, UNSPECIFIED: ICD-10-CM

## 2025-02-20 DIAGNOSIS — I10 ESSENTIAL (PRIMARY) HYPERTENSION: ICD-10-CM

## 2025-02-20 DIAGNOSIS — I50.9 HEART FAILURE, UNSPECIFIED: ICD-10-CM

## 2025-02-20 DIAGNOSIS — M41.50 OTHER SECONDARY SCOLIOSIS, SITE UNSPECIFIED: ICD-10-CM

## 2025-02-20 DIAGNOSIS — R63.4 ABNORMAL WEIGHT LOSS: ICD-10-CM

## 2025-02-20 DIAGNOSIS — R11.10 VOMITING, UNSPECIFIED: ICD-10-CM

## 2025-02-20 PROCEDURE — 93000 ELECTROCARDIOGRAM COMPLETE: CPT

## 2025-02-20 PROCEDURE — G2211 COMPLEX E/M VISIT ADD ON: CPT

## 2025-02-20 PROCEDURE — 99214 OFFICE O/P EST MOD 30 MIN: CPT

## 2025-02-20 RX ORDER — EMPAGLIFLOZIN 10 MG/1
10 TABLET, FILM COATED ORAL DAILY
Refills: 0 | Status: ACTIVE | COMMUNITY
Start: 2025-02-20

## 2025-03-05 ENCOUNTER — APPOINTMENT (OUTPATIENT)
Dept: ENDOCRINOLOGY | Facility: CLINIC | Age: 89
End: 2025-03-05
Payer: MEDICARE

## 2025-03-05 VITALS
HEIGHT: 57.5 IN | OXYGEN SATURATION: 97 % | WEIGHT: 127 LBS | DIASTOLIC BLOOD PRESSURE: 70 MMHG | BODY MASS INDEX: 27.03 KG/M2 | HEART RATE: 79 BPM | SYSTOLIC BLOOD PRESSURE: 122 MMHG

## 2025-03-05 DIAGNOSIS — T78.40XA ALLERGY, UNSPECIFIED, INITIAL ENCOUNTER: ICD-10-CM

## 2025-03-05 DIAGNOSIS — I10 ESSENTIAL (PRIMARY) HYPERTENSION: ICD-10-CM

## 2025-03-05 DIAGNOSIS — E78.00 PURE HYPERCHOLESTEROLEMIA, UNSPECIFIED: ICD-10-CM

## 2025-03-05 DIAGNOSIS — M81.0 AGE-RELATED OSTEOPOROSIS W/OUT CURRENT PATHOLOGICAL FRACTURE: ICD-10-CM

## 2025-03-05 DIAGNOSIS — I50.30 UNSPECIFIED DIASTOLIC (CONGESTIVE) HEART FAILURE: ICD-10-CM

## 2025-03-05 DIAGNOSIS — R53.82 CHRONIC FATIGUE, UNSPECIFIED: ICD-10-CM

## 2025-03-05 DIAGNOSIS — M40.209 UNSPECIFIED KYPHOSIS, SITE UNSPECIFIED: ICD-10-CM

## 2025-03-05 PROCEDURE — 99214 OFFICE O/P EST MOD 30 MIN: CPT | Mod: 25

## 2025-03-05 PROCEDURE — 96372 THER/PROPH/DIAG INJ SC/IM: CPT

## 2025-03-05 RX ORDER — PREDNISONE 10 MG/1
10 TABLET ORAL
Qty: 1 | Refills: 1 | Status: ACTIVE | COMMUNITY
Start: 2025-03-05 | End: 1900-01-01

## 2025-03-05 RX ORDER — DENOSUMAB 60 MG/ML
60 INJECTION SUBCUTANEOUS
Qty: 1 | Refills: 0 | Status: COMPLETED | OUTPATIENT
Start: 2025-03-05

## 2025-03-05 RX ADMIN — DENOSUMAB 60 MG/ML: 60 INJECTION SUBCUTANEOUS at 00:00

## 2025-03-10 DIAGNOSIS — Z00.00 ENCOUNTER FOR GENERAL ADULT MEDICAL EXAMINATION W/OUT ABNORMAL FINDINGS: ICD-10-CM

## 2025-03-11 ENCOUNTER — LABORATORY RESULT (OUTPATIENT)
Age: 89
End: 2025-03-11

## 2025-03-12 ENCOUNTER — LABORATORY RESULT (OUTPATIENT)
Age: 89
End: 2025-03-12

## 2025-03-13 ENCOUNTER — LABORATORY RESULT (OUTPATIENT)
Age: 89
End: 2025-03-13

## 2025-03-25 ENCOUNTER — NON-APPOINTMENT (OUTPATIENT)
Age: 89
End: 2025-03-25

## 2025-03-25 ENCOUNTER — APPOINTMENT (OUTPATIENT)
Dept: ALLERGY | Facility: CLINIC | Age: 89
End: 2025-03-25
Payer: MEDICARE

## 2025-03-25 VITALS — HEART RATE: 76 BPM | OXYGEN SATURATION: 98 % | TEMPERATURE: 98.2 F

## 2025-03-25 PROCEDURE — 99203 OFFICE O/P NEW LOW 30 MIN: CPT

## 2025-03-25 RX ORDER — MOMETASONE FUROATE 1 MG/G
0.1 CREAM TOPICAL DAILY
Qty: 1 | Refills: 0 | Status: ACTIVE | COMMUNITY
Start: 2025-03-25 | End: 1900-01-01

## 2025-04-02 DIAGNOSIS — M48.00 SPINAL STENOSIS, SITE UNSPECIFIED: ICD-10-CM

## 2025-04-02 DIAGNOSIS — R26.81 UNSTEADINESS ON FEET: ICD-10-CM

## 2025-06-05 ENCOUNTER — APPOINTMENT (OUTPATIENT)
Dept: ALLERGY | Facility: CLINIC | Age: 89
End: 2025-06-05
Payer: MEDICARE

## 2025-06-05 PROCEDURE — 99212 OFFICE O/P EST SF 10 MIN: CPT | Mod: 25

## 2025-06-05 PROCEDURE — 95004 PERQ TESTS W/ALRGNC XTRCS: CPT

## 2025-09-10 ENCOUNTER — APPOINTMENT (OUTPATIENT)
Dept: CARDIOLOGY | Facility: CLINIC | Age: 89
End: 2025-09-10

## 2025-09-17 ENCOUNTER — APPOINTMENT (OUTPATIENT)
Dept: ORTHOPEDIC SURGERY | Facility: CLINIC | Age: 89
End: 2025-09-17
Payer: MEDICARE

## 2025-09-17 VITALS — WEIGHT: 125 LBS | BODY MASS INDEX: 26.6 KG/M2 | HEIGHT: 57.5 IN

## 2025-09-17 DIAGNOSIS — M17.11 UNILATERAL PRIMARY OSTEOARTHRITIS, RIGHT KNEE: ICD-10-CM

## 2025-09-17 PROCEDURE — 99214 OFFICE O/P EST MOD 30 MIN: CPT | Mod: 25

## 2025-09-17 PROCEDURE — 20610 DRAIN/INJ JOINT/BURSA W/O US: CPT | Mod: RT

## 2025-09-17 PROCEDURE — 73564 X-RAY EXAM KNEE 4 OR MORE: CPT | Mod: RT

## 2025-09-17 RX ORDER — DICLOFENAC SODIUM 10 MG/G
1 GEL TOPICAL
Qty: 1 | Refills: 0 | Status: ACTIVE | COMMUNITY
Start: 2025-09-17 | End: 1900-01-01

## 2025-09-18 ENCOUNTER — APPOINTMENT (OUTPATIENT)
Dept: ENDOCRINOLOGY | Facility: CLINIC | Age: 89
End: 2025-09-18
Payer: MEDICARE

## 2025-09-18 DIAGNOSIS — M81.0 AGE-RELATED OSTEOPOROSIS W/OUT CURRENT PATHOLOGICAL FRACTURE: ICD-10-CM

## 2025-09-18 PROCEDURE — 96372 THER/PROPH/DIAG INJ SC/IM: CPT
